# Patient Record
Sex: FEMALE | Race: WHITE | Employment: OTHER | URBAN - METROPOLITAN AREA
[De-identification: names, ages, dates, MRNs, and addresses within clinical notes are randomized per-mention and may not be internally consistent; named-entity substitution may affect disease eponyms.]

---

## 2018-04-09 ENCOUNTER — OFFICE VISIT (OUTPATIENT)
Dept: ORTHOPEDIC SURGERY | Age: 73
End: 2018-04-09

## 2018-04-09 VITALS — BODY MASS INDEX: 40.3 KG/M2 | WEIGHT: 219 LBS | HEIGHT: 62 IN

## 2018-04-09 DIAGNOSIS — M25.512 ACUTE PAIN OF LEFT SHOULDER: Primary | ICD-10-CM

## 2018-04-09 PROCEDURE — 99214 OFFICE O/P EST MOD 30 MIN: CPT | Performed by: ORTHOPAEDIC SURGERY

## 2018-04-09 PROCEDURE — 1090F PRES/ABSN URINE INCON ASSESS: CPT | Performed by: ORTHOPAEDIC SURGERY

## 2018-04-09 PROCEDURE — 3014F SCREEN MAMMO DOC REV: CPT | Performed by: ORTHOPAEDIC SURGERY

## 2018-04-09 PROCEDURE — G8417 CALC BMI ABV UP PARAM F/U: HCPCS | Performed by: ORTHOPAEDIC SURGERY

## 2018-04-09 PROCEDURE — G8427 DOCREV CUR MEDS BY ELIG CLIN: HCPCS | Performed by: ORTHOPAEDIC SURGERY

## 2018-04-09 PROCEDURE — G8400 PT W/DXA NO RESULTS DOC: HCPCS | Performed by: ORTHOPAEDIC SURGERY

## 2018-04-09 PROCEDURE — 3017F COLORECTAL CA SCREEN DOC REV: CPT | Performed by: ORTHOPAEDIC SURGERY

## 2018-04-09 PROCEDURE — 4040F PNEUMOC VAC/ADMIN/RCVD: CPT | Performed by: ORTHOPAEDIC SURGERY

## 2018-04-09 PROCEDURE — 1123F ACP DISCUSS/DSCN MKR DOCD: CPT | Performed by: ORTHOPAEDIC SURGERY

## 2018-04-09 PROCEDURE — 1036F TOBACCO NON-USER: CPT | Performed by: ORTHOPAEDIC SURGERY

## 2018-04-18 ENCOUNTER — OFFICE VISIT (OUTPATIENT)
Dept: ORTHOPEDIC SURGERY | Age: 73
End: 2018-04-18

## 2018-04-18 VITALS — HEIGHT: 62 IN | BODY MASS INDEX: 40.29 KG/M2 | WEIGHT: 218.92 LBS

## 2018-04-18 DIAGNOSIS — M75.112 INCOMPLETE TEAR OF LEFT ROTATOR CUFF: Primary | ICD-10-CM

## 2018-04-18 PROCEDURE — G8400 PT W/DXA NO RESULTS DOC: HCPCS | Performed by: ORTHOPAEDIC SURGERY

## 2018-04-18 PROCEDURE — 1123F ACP DISCUSS/DSCN MKR DOCD: CPT | Performed by: ORTHOPAEDIC SURGERY

## 2018-04-18 PROCEDURE — 1090F PRES/ABSN URINE INCON ASSESS: CPT | Performed by: ORTHOPAEDIC SURGERY

## 2018-04-18 PROCEDURE — 4040F PNEUMOC VAC/ADMIN/RCVD: CPT | Performed by: ORTHOPAEDIC SURGERY

## 2018-04-18 PROCEDURE — 99214 OFFICE O/P EST MOD 30 MIN: CPT | Performed by: ORTHOPAEDIC SURGERY

## 2018-04-18 PROCEDURE — 20610 DRAIN/INJ JOINT/BURSA W/O US: CPT | Performed by: ORTHOPAEDIC SURGERY

## 2018-04-18 PROCEDURE — 1036F TOBACCO NON-USER: CPT | Performed by: ORTHOPAEDIC SURGERY

## 2018-04-18 PROCEDURE — G8427 DOCREV CUR MEDS BY ELIG CLIN: HCPCS | Performed by: ORTHOPAEDIC SURGERY

## 2018-04-18 PROCEDURE — 3017F COLORECTAL CA SCREEN DOC REV: CPT | Performed by: ORTHOPAEDIC SURGERY

## 2018-04-18 PROCEDURE — G8417 CALC BMI ABV UP PARAM F/U: HCPCS | Performed by: ORTHOPAEDIC SURGERY

## 2018-04-18 PROCEDURE — 3014F SCREEN MAMMO DOC REV: CPT | Performed by: ORTHOPAEDIC SURGERY

## 2018-04-23 ENCOUNTER — HOSPITAL ENCOUNTER (OUTPATIENT)
Dept: PHYSICAL THERAPY | Age: 73
Discharge: HOME | End: 2018-04-30
Admitting: ORTHOPAEDIC SURGERY

## 2018-04-26 ENCOUNTER — HOSPITAL ENCOUNTER (OUTPATIENT)
Dept: PHYSICAL THERAPY | Age: 73
Discharge: HOME | End: 2018-04-27
Admitting: ORTHOPAEDIC SURGERY

## 2018-05-01 ENCOUNTER — HOSPITAL ENCOUNTER (OUTPATIENT)
Dept: PHYSICAL THERAPY | Age: 73
Discharge: HOME | End: 2018-05-02
Admitting: ORTHOPAEDIC SURGERY

## 2018-05-01 ENCOUNTER — HOSPITAL ENCOUNTER (OUTPATIENT)
Dept: OTHER | Age: 73
Discharge: HOME | End: 2018-05-31
Attending: ORTHOPAEDIC SURGERY | Admitting: ORTHOPAEDIC SURGERY

## 2018-05-04 ENCOUNTER — HOSPITAL ENCOUNTER (OUTPATIENT)
Dept: PHYSICAL THERAPY | Age: 73
Discharge: HOME | End: 2018-05-05
Admitting: ORTHOPAEDIC SURGERY

## 2018-05-10 ENCOUNTER — HOSPITAL ENCOUNTER (OUTPATIENT)
Dept: PHYSICAL THERAPY | Age: 73
Discharge: HOME | End: 2018-05-11
Admitting: ORTHOPAEDIC SURGERY

## 2018-05-14 ENCOUNTER — HOSPITAL ENCOUNTER (OUTPATIENT)
Dept: PHYSICAL THERAPY | Age: 73
Discharge: HOME | End: 2018-05-15
Admitting: ORTHOPAEDIC SURGERY

## 2018-05-16 ENCOUNTER — OFFICE VISIT (OUTPATIENT)
Dept: ORTHOPEDIC SURGERY | Age: 73
End: 2018-05-16

## 2018-05-16 VITALS — BODY MASS INDEX: 40.29 KG/M2 | HEIGHT: 62 IN | WEIGHT: 218.92 LBS

## 2018-05-16 DIAGNOSIS — M75.112 INCOMPLETE TEAR OF LEFT ROTATOR CUFF: Primary | ICD-10-CM

## 2018-05-16 DIAGNOSIS — M25.512 ACUTE PAIN OF LEFT SHOULDER: ICD-10-CM

## 2018-05-16 PROCEDURE — 1123F ACP DISCUSS/DSCN MKR DOCD: CPT | Performed by: ORTHOPAEDIC SURGERY

## 2018-05-16 PROCEDURE — 4040F PNEUMOC VAC/ADMIN/RCVD: CPT | Performed by: ORTHOPAEDIC SURGERY

## 2018-05-16 PROCEDURE — G8427 DOCREV CUR MEDS BY ELIG CLIN: HCPCS | Performed by: ORTHOPAEDIC SURGERY

## 2018-05-16 PROCEDURE — 99214 OFFICE O/P EST MOD 30 MIN: CPT | Performed by: ORTHOPAEDIC SURGERY

## 2018-05-16 PROCEDURE — 3017F COLORECTAL CA SCREEN DOC REV: CPT | Performed by: ORTHOPAEDIC SURGERY

## 2018-05-16 PROCEDURE — 1036F TOBACCO NON-USER: CPT | Performed by: ORTHOPAEDIC SURGERY

## 2018-05-16 PROCEDURE — G8400 PT W/DXA NO RESULTS DOC: HCPCS | Performed by: ORTHOPAEDIC SURGERY

## 2018-05-16 PROCEDURE — G8417 CALC BMI ABV UP PARAM F/U: HCPCS | Performed by: ORTHOPAEDIC SURGERY

## 2018-05-16 PROCEDURE — 1090F PRES/ABSN URINE INCON ASSESS: CPT | Performed by: ORTHOPAEDIC SURGERY

## 2018-05-30 ENCOUNTER — OFFICE VISIT (OUTPATIENT)
Dept: ORTHOPEDIC SURGERY | Age: 73
End: 2018-05-30

## 2018-05-30 VITALS — HEIGHT: 62 IN | BODY MASS INDEX: 40.33 KG/M2 | WEIGHT: 219.14 LBS

## 2018-05-30 DIAGNOSIS — M79.671 RIGHT FOOT PAIN: Primary | ICD-10-CM

## 2018-05-30 PROCEDURE — 1036F TOBACCO NON-USER: CPT | Performed by: ORTHOPAEDIC SURGERY

## 2018-05-30 PROCEDURE — 4040F PNEUMOC VAC/ADMIN/RCVD: CPT | Performed by: ORTHOPAEDIC SURGERY

## 2018-05-30 PROCEDURE — 3017F COLORECTAL CA SCREEN DOC REV: CPT | Performed by: ORTHOPAEDIC SURGERY

## 2018-05-30 PROCEDURE — G8400 PT W/DXA NO RESULTS DOC: HCPCS | Performed by: ORTHOPAEDIC SURGERY

## 2018-05-30 PROCEDURE — G8417 CALC BMI ABV UP PARAM F/U: HCPCS | Performed by: ORTHOPAEDIC SURGERY

## 2018-05-30 PROCEDURE — 1090F PRES/ABSN URINE INCON ASSESS: CPT | Performed by: ORTHOPAEDIC SURGERY

## 2018-05-30 PROCEDURE — G8427 DOCREV CUR MEDS BY ELIG CLIN: HCPCS | Performed by: ORTHOPAEDIC SURGERY

## 2018-05-30 PROCEDURE — 99214 OFFICE O/P EST MOD 30 MIN: CPT | Performed by: ORTHOPAEDIC SURGERY

## 2018-05-30 PROCEDURE — 1123F ACP DISCUSS/DSCN MKR DOCD: CPT | Performed by: ORTHOPAEDIC SURGERY

## 2018-06-01 ENCOUNTER — HOSPITAL ENCOUNTER (OUTPATIENT)
Dept: OTHER | Age: 73
Discharge: HOME | End: 2018-06-30
Attending: ORTHOPAEDIC SURGERY | Admitting: ORTHOPAEDIC SURGERY

## 2018-06-05 ENCOUNTER — HOSPITAL ENCOUNTER (OUTPATIENT)
Dept: PHYSICAL THERAPY | Age: 73
Discharge: HOME | End: 2018-06-06
Admitting: ORTHOPAEDIC SURGERY

## 2018-06-08 ENCOUNTER — HOSPITAL ENCOUNTER (OUTPATIENT)
Dept: PHYSICAL THERAPY | Age: 73
Discharge: HOME | End: 2018-06-09
Admitting: ORTHOPAEDIC SURGERY

## 2018-06-12 ENCOUNTER — HOSPITAL ENCOUNTER (OUTPATIENT)
Dept: PHYSICAL THERAPY | Age: 73
Discharge: HOME | End: 2018-06-13
Admitting: ORTHOPAEDIC SURGERY

## 2018-06-15 ENCOUNTER — HOSPITAL ENCOUNTER (OUTPATIENT)
Dept: PHYSICAL THERAPY | Age: 73
Discharge: HOME | End: 2018-06-16
Admitting: ORTHOPAEDIC SURGERY

## 2018-06-19 ENCOUNTER — HOSPITAL ENCOUNTER (OUTPATIENT)
Dept: PHYSICAL THERAPY | Age: 73
Discharge: HOME | End: 2018-06-20
Admitting: ORTHOPAEDIC SURGERY

## 2018-06-22 ENCOUNTER — HOSPITAL ENCOUNTER (OUTPATIENT)
Dept: PHYSICAL THERAPY | Age: 73
Discharge: HOME | End: 2018-06-23
Admitting: ORTHOPAEDIC SURGERY

## 2018-06-26 ENCOUNTER — HOSPITAL ENCOUNTER (OUTPATIENT)
Dept: PHYSICAL THERAPY | Age: 73
Discharge: HOME | End: 2018-06-27
Admitting: ORTHOPAEDIC SURGERY

## 2018-06-29 ENCOUNTER — HOSPITAL ENCOUNTER (OUTPATIENT)
Dept: PHYSICAL THERAPY | Age: 73
Discharge: HOME | End: 2018-07-31
Admitting: ORTHOPAEDIC SURGERY

## 2018-06-29 NOTE — FLOWSHEET NOTE
NMR re-education      Post Tib activation       CK doming       Tandem balance 5 min B 10\" stance alternating. BOSU reach/Prop      Biodex Bal      BAPS      Wobble board 1 30ea  IN/EV, PF/DF   corner rotation   Airex        Therapeutic Exercise and NMR EXR  [x] (11874) Provided verbal/tactile cueing for activities related to strengthening, flexibility, endurance, ROM for improvements in foot/ankle,LE, proximal hip, and core control with self care, mobility, ambulation. [x] (62292) Provided verbal/tactile cueing for activities related to improving balance, coordination, kinesthetic sense, posture, motor skill, proprioception  to assist with foot/ankle, LE, proximal hip, and core control in self care, mobility, ambulation and eccentric single leg control.      NMR and Therapeutic Activities:    [] (12841 or 81921) Provided verbal/tactile cueing for activities related to improving balance, coordination, kinesthetic sense, posture, motor skill, proprioception and motor activation to allow for proper function of core, proximal hip and LE with self care and ADLs  [] (67045) Gait Re-education- Provided training and instruction to the patient for proper LE, core and proximal hip recruitment and positioning and eccentric body weight control with ambulation re-education including up and down stairs     Home Exercise Program:    [x] (27493) Reviewed/Progressed HEP activities related to strengthening, flexibility, endurance, ROM of core, proximal hip and LE for functional self-care, mobility, and ambulation/stair navigation   [] (39779)Reviewed/Progressed HEP activities related to improving balance, coordination, kinesthetic sense, posture, motor skill, proprioception of core, proximal hip and LE for self care, mobility, and ambulation/stair navigation      Manual Treatments:  PROM / STM / Oscillations-Mobs:  G-I, II, III, IV (PA's, Inf., Post.)  [x] (24201) Provided manual therapy to mobilize LE, proximal hip and/or LS spine soft tissue/joints for the purpose of modulating pain, promoting relaxation,  increasing ROM, reducing/eliminating soft tissue swelling/inflammation/restriction, improving soft tissue extensibility and allowing for proper ROM for normal function with self care, mobility, and ambulation. Modalities:  Game ready at end of session for 15 min    Charges:  Timed Code Treatment Minutes: 34   Total Treatment Minutes: 48     [] EVAL (LOW) 33930 (typically 20 minutes face-to-face)  [] EVAL (MOD) 25777 (typically 30 minutes face-to-face)  [] EVAL (HIGH) 52436 (typically 45 minutes face-to-face)  [] RE-EVAL     [x] XB(47524) x  1   [] IONTO  [] NMR (38372) x      [] VASO  [x] Manual (15933) x  1    [] Other:  [] TA x       [] Mech Traction (47347)  [] ES(attended) (59187)      [] ES (un) (70241):     GOALS:   Patient stated goal: To decrease foot pain with ambulation and stairs     Therapist goals for Patient:   Short Term Goals: To be achieved in: 2 weeks  1. Independent in HEP and progression per patient tolerance, in order to prevent re-injury. .      Long Term Goals: To be achieved in: 6 weeks  1. Disability index score of 25% or less for the LEFS to assist with reaching prior level of function. 2. Patient will demonstrate increased AROM to WNL to allow for proper joint functioning as indicated by patients Functional Deficits. 3. Patient will demonstrate an increase in Strength to good proximal hip strength and control, within 5lb HHD in LE to allow for proper functional mobility as indicated by patients Functional Deficits. 4. Patient will return to running activities without increased symptoms or restriction. 5. Patient will demonstrate improved gait mechanics in order to prevent further LE injury. 6. Patient will report minimal pain with stairs and walking greater than 30 minutes      Progression Towards Functional goals:  [x] Patient is progressing as expected towards functional goals listed.

## 2018-07-01 ENCOUNTER — HOSPITAL ENCOUNTER (OUTPATIENT)
Dept: OTHER | Age: 73
Discharge: HOME | End: 2018-07-01
Attending: ORTHOPAEDIC SURGERY | Admitting: ORTHOPAEDIC SURGERY

## 2018-07-02 ENCOUNTER — HOSPITAL ENCOUNTER (OUTPATIENT)
Dept: PHYSICAL THERAPY | Age: 73
Discharge: HOME | End: 2018-07-03
Admitting: ORTHOPAEDIC SURGERY

## 2018-07-02 NOTE — FLOWSHEET NOTE
4 min     TCJ 3  min     STJ      Long axis manip   G IV/   Cuboid/Cuneiform manip      STM/IASTM 12 min           NMR re-education      Post Tib activation       CKC doming       Tandem balance 5 min B 10\" stance alternating. BOSU reach/Prop      Biodex Bal      BAPS      Wobble board 1 30ea  IN/EV, PF/DF   corner rotation   Airex        Therapeutic Exercise and NMR EXR  [x] (45285) Provided verbal/tactile cueing for activities related to strengthening, flexibility, endurance, ROM for improvements in foot/ankle,LE, proximal hip, and core control with self care, mobility, ambulation. [x] (11808) Provided verbal/tactile cueing for activities related to improving balance, coordination, kinesthetic sense, posture, motor skill, proprioception  to assist with foot/ankle, LE, proximal hip, and core control in self care, mobility, ambulation and eccentric single leg control.      NMR and Therapeutic Activities:    [] (28712 or 60804) Provided verbal/tactile cueing for activities related to improving balance, coordination, kinesthetic sense, posture, motor skill, proprioception and motor activation to allow for proper function of core, proximal hip and LE with self care and ADLs  [] (44118) Gait Re-education- Provided training and instruction to the patient for proper LE, core and proximal hip recruitment and positioning and eccentric body weight control with ambulation re-education including up and down stairs     Home Exercise Program:    [x] (87336) Reviewed/Progressed HEP activities related to strengthening, flexibility, endurance, ROM of core, proximal hip and LE for functional self-care, mobility, and ambulation/stair navigation   [] (13946)Reviewed/Progressed HEP activities related to improving balance, coordination, kinesthetic sense, posture, motor skill, proprioception of core, proximal hip and LE for self care, mobility, and ambulation/stair navigation      Manual Treatments:  PROM / STM / Oscillations-Mobs: G-I, II, III, IV (PA's, Inf., Post.)  [x] (53287) Provided manual therapy to mobilize LE, proximal hip and/or LS spine soft tissue/joints for the purpose of modulating pain, promoting relaxation,  increasing ROM, reducing/eliminating soft tissue swelling/inflammation/restriction, improving soft tissue extensibility and allowing for proper ROM for normal function with self care, mobility, and ambulation. Modalities:  Game ready at end of session for 15 min    Charges:  Timed Code Treatment Minutes: 34   Total Treatment Minutes: 40     [] EVAL (LOW) 10479 (typically 20 minutes face-to-face)  [] EVAL (MOD) 36141 (typically 30 minutes face-to-face)  [] EVAL (HIGH) 80143 (typically 45 minutes face-to-face)  [] RE-EVAL     [] SN(46879) x      [] IONTO  [x] NMR (65360) x  1   [] VASO  [x] Manual (83246) x  1    [] Other:  [] TA x       [] Mech Traction (66019)  [] ES(attended) (46724)      [] ES (un) (67675):     GOALS:   Patient stated goal: To decrease foot pain with ambulation and stairs     Therapist goals for Patient:   Short Term Goals: To be achieved in: 2 weeks  1. Independent in HEP and progression per patient tolerance, in order to prevent re-injury. .      Long Term Goals: To be achieved in: 6 weeks  1. Disability index score of 25% or less for the LEFS to assist with reaching prior level of function. 2. Patient will demonstrate increased AROM to WNL to allow for proper joint functioning as indicated by patients Functional Deficits. 3. Patient will demonstrate an increase in Strength to good proximal hip strength and control, within 5lb HHD in LE to allow for proper functional mobility as indicated by patients Functional Deficits. 4. Patient will return to running activities without increased symptoms or restriction. 5. Patient will demonstrate improved gait mechanics in order to prevent further LE injury.    6. Patient will report minimal pain with stairs and walking greater than 30 minutes      Progression Towards Functional goals:  [x] Patient is progressing as expected towards functional goals listed. [] Progression is slowed due to complexities listed. [] Progression has been slowed due to co-morbidities. [] Plan just implemented, too soon to assess goals progression  [] Other:     ASSESSMENT:  Patient continues to ambulate with limited great toe extension during push off stage of the gait cycle. Hypomobility throughout the first ray also noted. Good response to manual therapy today. Treatment/Activity Tolerance:  [] Patient tolerated treatment well [] Patient limited by fatique  [x] Patient limited by pain  [] Patient limited by other medical complications  [] Other:     Prognosis: [x] Good [] Fair  [] Poor    Patient Requires Follow-up: [x] Yes  [] No    PLAN: PN NEXT SESSION.   [x] Continue per plan of care [] Alter current plan (see comments)  [] Plan of care initiated [] Hold pending MD visit [] Discharge    Electronically signed by: Aleksey Mcneal PT

## 2018-07-06 ENCOUNTER — HOSPITAL ENCOUNTER (OUTPATIENT)
Dept: PHYSICAL THERAPY | Age: 73
Discharge: HOME | End: 2018-07-07
Admitting: ORTHOPAEDIC SURGERY

## 2018-07-06 NOTE — PLAN OF CARE
none    Exercises/Interventions:   Therapeutic Ex Sets/sec Reps Notes   Toe lifts/ heel raise 1 20    Seated Iv/Ev Glute Med      Hip ext      4 way ankle      TM Gait/ push off      Step-ups fwd/lat 1 20ea Air-ex pad         Gastroc Flexibility 3 20-30\"    Soleus Flexibility 3 20-30\"                Manual Interventon      Ankle Manip/Mobs 4 min     TCJ 3  min     STJ      Long axis manip   G IV/   Cuboid/Cuneiform manip      STM/IASTM 12 min           NMR re-education      Post Tib activation       CKC doming       Tandem balance 5 min B 10\" stance alternating. BOSU reach/Prop      Biodex Bal      BAPS      Wobble board 1 30ea  IN/EV, PF/DF   corner rotation   Airex        Therapeutic Exercise and NMR EXR  [x] (56794) Provided verbal/tactile cueing for activities related to strengthening, flexibility, endurance, ROM for improvements in foot/ankle,LE, proximal hip, and core control with self care, mobility, ambulation. [x] (22936) Provided verbal/tactile cueing for activities related to improving balance, coordination, kinesthetic sense, posture, motor skill, proprioception  to assist with foot/ankle, LE, proximal hip, and core control in self care, mobility, ambulation and eccentric single leg control.      NMR and Therapeutic Activities:    [] (29020 or 64178) Provided verbal/tactile cueing for activities related to improving balance, coordination, kinesthetic sense, posture, motor skill, proprioception and motor activation to allow for proper function of core, proximal hip and LE with self care and ADLs  [] (46110) Gait Re-education- Provided training and instruction to the patient for proper LE, core and proximal hip recruitment and positioning and eccentric body weight control with ambulation re-education including up and down stairs     Home Exercise Program:    [x] (92161) Reviewed/Progressed HEP activities related to strengthening, flexibility, endurance, ROM of core, proximal hip and LE for functional

## 2018-07-18 ENCOUNTER — OFFICE VISIT (OUTPATIENT)
Dept: ORTHOPEDIC SURGERY | Age: 73
End: 2018-07-18

## 2018-07-18 VITALS — WEIGHT: 219 LBS | HEIGHT: 62 IN | BODY MASS INDEX: 40.3 KG/M2

## 2018-07-18 DIAGNOSIS — M79.671 RIGHT FOOT PAIN: Primary | ICD-10-CM

## 2018-07-18 PROCEDURE — 1036F TOBACCO NON-USER: CPT | Performed by: ORTHOPAEDIC SURGERY

## 2018-07-18 PROCEDURE — 1101F PT FALLS ASSESS-DOCD LE1/YR: CPT | Performed by: ORTHOPAEDIC SURGERY

## 2018-07-18 PROCEDURE — 4040F PNEUMOC VAC/ADMIN/RCVD: CPT | Performed by: ORTHOPAEDIC SURGERY

## 2018-07-18 PROCEDURE — G8417 CALC BMI ABV UP PARAM F/U: HCPCS | Performed by: ORTHOPAEDIC SURGERY

## 2018-07-18 PROCEDURE — G8427 DOCREV CUR MEDS BY ELIG CLIN: HCPCS | Performed by: ORTHOPAEDIC SURGERY

## 2018-07-18 PROCEDURE — G8400 PT W/DXA NO RESULTS DOC: HCPCS | Performed by: ORTHOPAEDIC SURGERY

## 2018-07-18 PROCEDURE — 3017F COLORECTAL CA SCREEN DOC REV: CPT | Performed by: ORTHOPAEDIC SURGERY

## 2018-07-18 PROCEDURE — 1123F ACP DISCUSS/DSCN MKR DOCD: CPT | Performed by: ORTHOPAEDIC SURGERY

## 2018-07-18 PROCEDURE — 1090F PRES/ABSN URINE INCON ASSESS: CPT | Performed by: ORTHOPAEDIC SURGERY

## 2018-07-18 PROCEDURE — 99213 OFFICE O/P EST LOW 20 MIN: CPT | Performed by: ORTHOPAEDIC SURGERY

## 2018-08-01 ENCOUNTER — HOSPITAL ENCOUNTER (OUTPATIENT)
Dept: OTHER | Age: 73
Discharge: HOME | End: 2018-08-31
Attending: ORTHOPAEDIC SURGERY | Admitting: ORTHOPAEDIC SURGERY

## 2019-03-07 PROBLEM — E83.52 HYPERCALCEMIA: Status: ACTIVE | Noted: 2019-03-07

## 2019-03-07 PROBLEM — N17.9 AKI (ACUTE KIDNEY INJURY) (HCC): Status: ACTIVE | Noted: 2019-03-07

## 2019-03-07 PROBLEM — I10 ESSENTIAL HYPERTENSION: Status: ACTIVE | Noted: 2019-03-07

## 2019-08-12 ENCOUNTER — APPOINTMENT (RX ONLY)
Dept: URBAN - METROPOLITAN AREA CLINIC 170 | Facility: CLINIC | Age: 74
Setting detail: DERMATOLOGY
End: 2019-08-12

## 2019-08-12 DIAGNOSIS — B07.8 OTHER VIRAL WARTS: ICD-10-CM

## 2019-08-12 DIAGNOSIS — D22 MELANOCYTIC NEVI: ICD-10-CM

## 2019-08-12 DIAGNOSIS — L85.3 XEROSIS CUTIS: ICD-10-CM

## 2019-08-12 PROBLEM — D22.71 MELANOCYTIC NEVI OF RIGHT LOWER LIMB, INCLUDING HIP: Status: ACTIVE | Noted: 2019-08-12

## 2019-08-12 PROCEDURE — ? ADDITIONAL NOTES

## 2019-08-12 PROCEDURE — 99213 OFFICE O/P EST LOW 20 MIN: CPT | Mod: 25

## 2019-08-12 PROCEDURE — 17110 DESTRUCTION B9 LES UP TO 14: CPT

## 2019-08-12 PROCEDURE — ? PRESCRIPTION SAMPLES PROVIDED

## 2019-08-12 PROCEDURE — ? COUNSELING

## 2019-08-12 PROCEDURE — ? LIQUID NITROGEN

## 2019-08-12 ASSESSMENT — LOCATION DETAILED DESCRIPTION DERM
LOCATION DETAILED: RIGHT PROXIMAL PRETIBIAL REGION
LOCATION DETAILED: LEFT ANTERIOR PROXIMAL THIGH
LOCATION DETAILED: RIGHT PROXIMAL DORSAL FOREARM
LOCATION DETAILED: LEFT PROXIMAL PRETIBIAL REGION
LOCATION DETAILED: LEFT PROXIMAL DORSAL FOREARM
LOCATION DETAILED: RIGHT SUPERIOR UPPER BACK
LOCATION DETAILED: RIGHT ANKLE

## 2019-08-12 ASSESSMENT — LOCATION SIMPLE DESCRIPTION DERM
LOCATION SIMPLE: LEFT PRETIBIAL REGION
LOCATION SIMPLE: RIGHT ANKLE
LOCATION SIMPLE: RIGHT FOREARM
LOCATION SIMPLE: LEFT FOREARM
LOCATION SIMPLE: RIGHT PRETIBIAL REGION
LOCATION SIMPLE: RIGHT UPPER BACK
LOCATION SIMPLE: LEFT THIGH

## 2019-08-12 ASSESSMENT — LOCATION ZONE DERM
LOCATION ZONE: LEG
LOCATION ZONE: TRUNK
LOCATION ZONE: ARM

## 2019-08-12 NOTE — HPI: SKIN LESIONS
How Severe Is Your Skin Lesion?: mild
Have Your Skin Lesions Been Treated?: not been treated
Is This A New Presentation, Or A Follow-Up?: Skin Lesions
Which Family Member (Optional)?: Dad
Additional History: Patient stAtes the area is sore

## 2019-11-11 ENCOUNTER — APPOINTMENT (RX ONLY)
Dept: URBAN - METROPOLITAN AREA CLINIC 170 | Facility: CLINIC | Age: 74
Setting detail: DERMATOLOGY
End: 2019-11-11

## 2019-11-11 DIAGNOSIS — D22 MELANOCYTIC NEVI: ICD-10-CM

## 2019-11-11 DIAGNOSIS — D18.0 HEMANGIOMA: ICD-10-CM

## 2019-11-11 DIAGNOSIS — L81.4 OTHER MELANIN HYPERPIGMENTATION: ICD-10-CM

## 2019-11-11 DIAGNOSIS — L82.0 INFLAMED SEBORRHEIC KERATOSIS: ICD-10-CM

## 2019-11-11 DIAGNOSIS — L82.1 OTHER SEBORRHEIC KERATOSIS: ICD-10-CM

## 2019-11-11 PROBLEM — D18.01 HEMANGIOMA OF SKIN AND SUBCUTANEOUS TISSUE: Status: ACTIVE | Noted: 2019-11-11

## 2019-11-11 PROBLEM — D22.5 MELANOCYTIC NEVI OF TRUNK: Status: ACTIVE | Noted: 2019-11-11

## 2019-11-11 PROCEDURE — ? COUNSELING

## 2019-11-11 PROCEDURE — 17110 DESTRUCTION B9 LES UP TO 14: CPT

## 2019-11-11 PROCEDURE — ? LIQUID NITROGEN

## 2019-11-11 PROCEDURE — ? FULL BODY SKIN EXAM

## 2019-11-11 PROCEDURE — 99214 OFFICE O/P EST MOD 30 MIN: CPT | Mod: 25

## 2019-11-11 PROCEDURE — ? SUNSCREEN RECOMMENDATIONS

## 2019-11-11 ASSESSMENT — LOCATION DETAILED DESCRIPTION DERM
LOCATION DETAILED: EPIGASTRIC SKIN
LOCATION DETAILED: RIGHT DISTAL POSTERIOR UPPER ARM
LOCATION DETAILED: RIGHT ANTERIOR SHOULDER
LOCATION DETAILED: LEFT LATERAL ABDOMEN
LOCATION DETAILED: INFERIOR THORACIC SPINE
LOCATION DETAILED: RIGHT INFERIOR MEDIAL MIDBACK

## 2019-11-11 ASSESSMENT — LOCATION SIMPLE DESCRIPTION DERM
LOCATION SIMPLE: RIGHT LOWER BACK
LOCATION SIMPLE: RIGHT SHOULDER
LOCATION SIMPLE: RIGHT POSTERIOR UPPER ARM
LOCATION SIMPLE: ABDOMEN
LOCATION SIMPLE: UPPER BACK

## 2019-11-11 ASSESSMENT — LOCATION ZONE DERM
LOCATION ZONE: TRUNK
LOCATION ZONE: ARM

## 2019-11-11 NOTE — PROCEDURE: LIQUID NITROGEN
Number Of Freeze-Thaw Cycles: 2 freeze-thaw cycles
Render Note In Bullet Format When Appropriate: No
Medical Necessity Clause: This procedure was medically necessary because the lesions that were treated were:
Medical Necessity Information: It is in your best interest to select a reason for this procedure from the list below. All of these items fulfill various CMS LCD requirements except the new and changing color options.
Detail Level: Detailed
Consent: The patient's consent was obtained including but not limited to risks of crusting, scabbing, blistering, scarring, darker or lighter pigmentary change, recurrence, incomplete removal and infection.
Post-Care Instructions: I reviewed with the patient in detail post-care instructions. Patient is to wear sunprotection, and avoid picking at any of the treated lesions. Pt may apply Vaseline to crusted or scabbing areas.
Render Post-Care Instructions In Note?: yes

## 2020-03-25 ENCOUNTER — TELEPHONE (OUTPATIENT)
Dept: ORTHOPEDIC SURGERY | Age: 75
End: 2020-03-25

## 2020-03-25 ENCOUNTER — OFFICE VISIT (OUTPATIENT)
Dept: ORTHOPEDIC SURGERY | Age: 75
End: 2020-03-25
Payer: MEDICARE

## 2020-03-25 VITALS — WEIGHT: 222 LBS | BODY MASS INDEX: 40.85 KG/M2 | HEIGHT: 62 IN

## 2020-03-25 PROCEDURE — 99214 OFFICE O/P EST MOD 30 MIN: CPT | Performed by: ORTHOPAEDIC SURGERY

## 2020-03-25 PROCEDURE — L3908 WHO COCK-UP NONMOLDE PRE OTS: HCPCS | Performed by: ORTHOPAEDIC SURGERY

## 2020-03-25 RX ORDER — PREDNISONE 10 MG/1
TABLET ORAL
Qty: 30 TABLET | Refills: 0 | Status: SHIPPED | OUTPATIENT
Start: 2020-03-25 | End: 2020-10-13 | Stop reason: ALTCHOICE

## 2020-03-25 NOTE — PROGRESS NOTES
week: None     Minutes per session: None    Stress: None   Relationships    Social connections     Talks on phone: None     Gets together: None     Attends Restorationism service: None     Active member of club or organization: None     Attends meetings of clubs or organizations: None     Relationship status: None    Intimate partner violence     Fear of current or ex partner: None     Emotionally abused: None     Physically abused: None     Forced sexual activity: None   Other Topics Concern    None   Social History Narrative    None     Current Outpatient Medications   Medication Sig Dispense Refill    aspirin (ASPIR-81) 81 MG EC tablet Aspir-81   take one at night      GEMFIBROZIL PO Take by mouth      loratadine (CLARITIN) 10 MG tablet Claritin 10 mg tablet   Take 1 tablet every day by oral route in the morning.  guaiFENesin (MUCINEX MAXIMUM STRENGTH) 1200 MG TB12 Mucinex 1,200 mg tablet, extended release   Take 1 tablet twice a day by oral route.  pravastatin (PRAVACHOL) 20 MG tablet pravastatin 20 mg tablet   Take 1 tablet every day by oral route.  Probiotic Product (PROBIOTIC-10 PO) Probiotic   1 x day      NUTRITIONAL SUPPLEMENTS PO Take by mouth      MULTIPLE VITAMINS-MINERALS ER PO Take by mouth      Magnesium 100 MG CAPS magnesium   500mg one daily      Multiple Minerals-Vitamins (CITRACAL PLUS BONE DENSITY PO) Citracal + Bone Density   2 scoops daily      Coenzyme Q10 10 MG CAPS Take 1 capsule by mouth      Cranberry-Vitamin C-Probiotic (AZO CRANBERRY) 250-30 MG TABS Azo Cranberry + Probiotic      B COMPLEX VITAMINS ER PO Take 2 tablets by mouth      Omega-3 Fatty Acids (FISH OIL PO) Take by mouth      Omega-3 Fatty Acids (FISH OIL) 1000 MG CAPS Fish Oil   500mg  3 x day      atenolol (TENORMIN) 50 MG tablet Take 50 mg by mouth daily.  cyclobenzaprine (FLEXERIL) 10 MG tablet Take 10 mg by mouth.  insulin lispro (HUMALOG) 100 UNIT/ML pen Inject  into the skin.       insulin NPH (HUMULIN N) 100 UNIT/ML injection vial Inject  into the skin.  levothyroxine (SYNTHROID) 100 MCG tablet Take 100 mcg by mouth.  lisinopril (PRINIVIL;ZESTRIL) 20 MG tablet Take 20 mg by mouth.  metFORMIN (GLUCOPHAGE) 500 MG tablet Take 500 mg by mouth.  nateglinide (STARLIX) 120 MG tablet Take 120 mg by mouth.  omeprazole (PRILOSEC) 40 MG capsule Take 40 mg by mouth.  triamterene-hydrochlorothiazide (DYAZIDE) 37.5-25 MG per capsule Take 1 capsule by mouth.  zolpidem (AMBIEN) 10 MG tablet Take 15 mg by mouth. No current facility-administered medications for this visit. Allergies   Allergen Reactions    Atorvastatin Other (See Comments)    Adhesive Tape Rash    Codeine Nausea And Vomiting and Nausea Only    Sulfa Antibiotics Other (See Comments) and Rash       REVIEW OF SYSTEMS:   No rashes today  No new numbness  No tingling  No fevers  No depression  No new onset of pain        Pertinent items are noted in HPI  Review of systems reviewed from Patient History Form dated on 3/25/2020 and available in the patient's chart under the Media tab. Examination:    General Exam:    Vitals: Height 5' 2\" (1.575 m), weight 222 lb (100.7 kg), not currently breastfeeding. Constitutional: Patient is adequately groomed with no evidence of malnutrition  Mental Status: The patient is oriented to time, place and person. The patient's mood and affect are appropriate. Lymphatic: The lymphatic examination bilaterally reveals all areas to be without enlargement or induration. Vascular: Examination reveals no swelling or calf tenderness. Peripheral pulses are palpable and 2+. Neurological: The patient has good coordination. There is no weakness or sensory deficit. Skin:    Head/Neck: inspection reveals no rashes, ulcerations or lesions. Trunk:  inspection reveals no rashes, ulcerations or lesions.   Right Lower Extremity:

## 2020-06-23 ENCOUNTER — APPOINTMENT (RX ONLY)
Dept: URBAN - METROPOLITAN AREA CLINIC 170 | Facility: CLINIC | Age: 75
Setting detail: DERMATOLOGY
End: 2020-06-23

## 2020-06-23 DIAGNOSIS — L82.1 OTHER SEBORRHEIC KERATOSIS: ICD-10-CM

## 2020-06-23 DIAGNOSIS — L57.0 ACTINIC KERATOSIS: ICD-10-CM | Status: RESOLVED

## 2020-06-23 PROCEDURE — ? COUNSELING

## 2020-06-23 PROCEDURE — 99213 OFFICE O/P EST LOW 20 MIN: CPT

## 2020-06-23 PROCEDURE — ? PRESCRIPTION MEDICATION MANAGEMENT

## 2020-06-23 PROCEDURE — ? ADDITIONAL NOTES

## 2020-06-23 ASSESSMENT — LOCATION ZONE DERM
LOCATION ZONE: FACE
LOCATION ZONE: HAND

## 2020-06-23 ASSESSMENT — LOCATION SIMPLE DESCRIPTION DERM
LOCATION SIMPLE: GLABELLA
LOCATION SIMPLE: LEFT HAND

## 2020-06-23 ASSESSMENT — LOCATION DETAILED DESCRIPTION DERM
LOCATION DETAILED: GLABELLA
LOCATION DETAILED: LEFT RADIAL DORSAL HAND

## 2020-06-23 NOTE — HPI: SKIN LESION
What Type Of Note Output Would You Prefer (Optional)?: Bullet Format
How Severe Is Your Skin Lesion?: mild
Has Your Skin Lesion Been Treated?: been treated
Is This A New Presentation, Or A Follow-Up?: Follow Up Skin Lesion
Additional History: Patient states the lesion is gone

## 2020-07-22 ENCOUNTER — OFFICE VISIT (OUTPATIENT)
Dept: ORTHOPEDIC SURGERY | Age: 75
End: 2020-07-22
Payer: MEDICARE

## 2020-07-22 VITALS — WEIGHT: 222 LBS | TEMPERATURE: 97.8 F | BODY MASS INDEX: 40.85 KG/M2 | HEIGHT: 62 IN

## 2020-07-22 PROCEDURE — 99214 OFFICE O/P EST MOD 30 MIN: CPT | Performed by: ORTHOPAEDIC SURGERY

## 2020-07-22 PROCEDURE — 1090F PRES/ABSN URINE INCON ASSESS: CPT | Performed by: ORTHOPAEDIC SURGERY

## 2020-07-22 PROCEDURE — 3017F COLORECTAL CA SCREEN DOC REV: CPT | Performed by: ORTHOPAEDIC SURGERY

## 2020-07-22 PROCEDURE — G8417 CALC BMI ABV UP PARAM F/U: HCPCS | Performed by: ORTHOPAEDIC SURGERY

## 2020-07-22 PROCEDURE — 1123F ACP DISCUSS/DSCN MKR DOCD: CPT | Performed by: ORTHOPAEDIC SURGERY

## 2020-07-22 PROCEDURE — G8400 PT W/DXA NO RESULTS DOC: HCPCS | Performed by: ORTHOPAEDIC SURGERY

## 2020-07-22 PROCEDURE — 1036F TOBACCO NON-USER: CPT | Performed by: ORTHOPAEDIC SURGERY

## 2020-07-22 PROCEDURE — G8427 DOCREV CUR MEDS BY ELIG CLIN: HCPCS | Performed by: ORTHOPAEDIC SURGERY

## 2020-07-22 PROCEDURE — 4040F PNEUMOC VAC/ADMIN/RCVD: CPT | Performed by: ORTHOPAEDIC SURGERY

## 2020-07-22 NOTE — PROGRESS NOTES
7/22/20  History of Present Illness:  Jerome Hannon is a 76 y.o. female    Chief complaint today in the office: Being seen for the first time for new problem left leg pain is been going on for approximately 3 weeks pain is posterior from the glutes low back glutes thigh knee back of the calf and foot    Location left knee   Severity moderate  Duration 3+ weeks  Associated sign/symptoms pain all the way down the left lower extremity    Medical History  Patient's medications, allergies, past medical, surgical, social and family histories were reviewed and updated as appropriate. I have reviewed and discussed the below Pain assessment findings with the patient. Pain Assessment  Location of Pain: Knee(Simultaneous filing. User may not have seen previous data.)  Location Modifiers: Left(Simultaneous filing. User may not have seen previous data.)  Severity of Pain: 0(Simultaneous filing. User may not have seen previous data.)  Quality of Pain: Throbbing, Sharp, Dull, Aching  Duration of Pain: Persistent  Frequency of Pain: Constant  Aggravating Factors: Bending, Stretching, Straightening, Exercise, Kneeling, Squatting, Standing, Walking, Stairs, Other (Comment)  Limiting Behavior: Yes  Relieving Factors: Rest, Nsaids, Other (Comment)  Result of Injury: No  Work-Related Injury: No  Are there other pain locations you wish to document?: No    Review of Systems  No new rashes  No numbness  No tingling  No fever  No depression  No new pain patterns  Pertinent items are noted in HPI  Review of systems reviewed from Patient History Form dated on 7/22/2020 and available in the patient's chart under the Media tab. No change in the patient's medical history form. Examination:  General Exam:    Vitals: Temperature 97.8 °F (36.6 °C), height 5' 2\" (1.575 m), weight 222 lb (100.7 kg), not currently breastfeeding.   BMI Readings from Last 3 Encounters:   07/22/20 40.60 kg/m²   03/25/20 40.60 kg/m²   03/07/19 40.60 kg/m² Constitutional: Patient is adequately groomed with no evidence of malnutrition  Mental Status: The patient is alert and oriented to time, place and person. The patient's mood and affect are appropriate. Lymphatic: The lymphatic examination bilaterally reveals all areas to be without enlargement or induration. Vascular: Examination reveals no swelling or calf tenderness. Peripheral pulses are palpable and 2+. Neurological: The patient has good coordination. There is no weakness or sensory deficit. Skin:    Head/Neck: inspection reveals no rashes, ulcerations or lesions. Trunk:  inspection reveals no rashes, ulcerations or lesions. Right Lower Extremity: inspection reveals no rashes, ulcerations or lesions. Left Lower Extremity: inspection reveals no rashes, ulcerations or lesions. PHYSICAL EXAM:      Knee Examination  Inspection:  No abrasions no lacerations no signs of infection or obvious deformity no obvious swelling and joint effusion. Palpation:   Palpation reveals no pain medial joint line,   No lateral joint line pain, no joint effusion. Range of Motion: 0-140 degrees flexion/ extension   Hip extension to 20 hip flexion to 70+  Lumbar ROM -20 extension flexion to 6 inches from the floor. Strength: Quadriceps testing 5/5, hamstring muscle testing 5/5, EHL against resistance is 5/5, hip flexor strength is intact 5/5, internal and external rotation of the hip against resistance is also intact 5/5.     Special Tests: stable Lachman, negative anterior drawer, negative pivot shift, no posterior sag no posterior drawer does not open to valgus or varus stress at 0 or 30°, Steinmann's negative, Alem's negative, Homans negative Markell negative, pedal pulses are +2/4 capillary refill is brisk sensation is intact ankle exam and hip exam are shows no pain with full range of motion provocative testing of the hip is nonpainful muscle testing around the hip is 5 over 5. Lumbar flexion to 6 inches from floor without pain. She does have pain to palpation of the left lumbar spine she has pain with flexion of the lumbar spine she has pain with a straight leg raise she has no pain with hip provocative testing EHL is 5/5- Homans negative Markell negative Mcnulty's test    Gait: antalgic     Reflex: Intact  Lower extremity Deep tendon reflexes +2/4 and equal bilaterally for patella and Achilles. Upper extremity reflexes:  of the biceps, triceps, brachioradialis +2/4 equal bilaterally. Contralateral  Knee: Negative Lachman negative anterior drawer negative pivot shift no posterior sag no posterior drawer does not open to valgus or varus stress at 0 or 30° negative Steinmann's negative Alem's negative Homans negative Markell pedal pulses are +2/4 capillary refill is brisk sensation is intact ankle exam and hip exam are shows no pain with full range of motion provocative testing of the hip is nonpainful muscle testing around the hip is 5 over 5. Hip and lumbar testing does not reproduce pain evocative testing does not reproduce symptomatology. Additional Examinations:  Right Lower Extremity: Examination of the right lower extremity does not show any tenderness, deformity or injury. Range of motion is unremarkable. There is no gross instability. There are no rashes, ulcerations or lesions. Strength and tone are normal.  Left Lower Extremity: Examination of the left lower extremity does not show any tenderness, deformity or injury. Range of motion is unremarkable. There is no gross instability. There are no rashes, ulcerations or lesions. Strength and tone are normal.  Neck: Examination of the neck does not show any tenderness, deformity or injury. Range of motion is unremarkable. There is no gross instability. There are no rashes, ulcerations or lesions.   Strength and tone are normal.    Past Surgical History:   Procedure Laterality Date    CHOLECYSTECTOMY

## 2020-08-31 ENCOUNTER — HOSPITAL ENCOUNTER (OUTPATIENT)
Dept: PHYSICAL THERAPY | Age: 75
Setting detail: THERAPIES SERIES
Discharge: HOME | End: 2020-08-31
Payer: MEDICARE

## 2020-08-31 PROCEDURE — 97162 PT EVAL MOD COMPLEX 30 MIN: CPT

## 2020-08-31 PROCEDURE — 97110 THERAPEUTIC EXERCISES: CPT

## 2020-08-31 PROCEDURE — 97140 MANUAL THERAPY 1/> REGIONS: CPT

## 2020-08-31 NOTE — FLOWSHEET NOTE
[address] [address]  Phone: [phone]   Fax:     [phone]    Physical Therapy Treatment Note/ Progress Report:     Date:  2020    Patient Name:  Yamila Rodgers    :  1945  MRN: [id number]  Restrictions/Precautions:    Medical/Treatment Diagnosis Information:  · Diagnosis: L knee pain, lumbar radiculopathy  · Treatment Diagnosis: L knee pain M25.562, LBP P68.9  Insurance/Certification information:  PT Insurance Information: Medicare/Medical [place]  Physician Information:  Referring Practitioner: Karthikeyan Maya of care signed (Y/N):     Date of Patient follow up with Physician:      Progress Report: [x]  Yes  []  No     Date Range for reporting period:  Beginnin2020  Ending: -    Progress report due (10 Rx/or 30 days whichever is less): 8167     Recertification due (POC duration/ or 90 days whichever is less):2020     Visit # Insurance Allowable Auth Needed   1 Medicare/Med [place] []Yes    [x]No     Pain level:  4-5/10   Functional Scale: LEFS 70% disability   Date Assessed: 2020    SUBJECTIVE:  See eval    OBJECTIVE: See eval   Observation:    Test measurements:      RESTRICTIONS/PRECAUTIONS: none    Exercises/Interventions:     Therapeutic Ex (11872)   Min: 12 min sets/sec reps notes   Hip ER stretch 30 4    Hamstring stretch 30 4          Hip Ext      Bridge  2 10    Kneeling Alt Arm-Leg      Side lying LB rot      Front Plank      Side planks       Kneeling hip abd/ext      1/2 kneeling down chop      Std band pull down      SL hip abd/clam      Lateral band pull      Lateral band walk      Bosu Lunge      Slide lunge       Ham string stretch      Hip Flex stretch      Glute Stretch      SLS Ball/wall glute      Manual Intervention (22948) Min: 15 min      DN      Prone PA      GISTM/STM      Lumbar Manip      SI Manip      Hip belt mobs 1 8    Hip LA distraction      IASTM 1 7 tissue extensibility and allowing for proper ROM for normal function with self care, mobility, lifting and ambulation. Modalities:       Charges:  Timed Code Treatment Minutes: 27   Total Treatment Minutes: 60     [] EVAL (LOW) 80577 (typically 20 minutes face-to-face)  [x] EVAL (MOD) 31192 (typically 30 minutes face-to-face)  [] EVAL (HIGH) 95446 (typically 45 minutes face-to-face)  [] RE-EVAL     [x] UO(58274) x  1   [] DRY NEEDLE 1 OR 2 MUSCLES  [] NMR (10095) x     [] DRY NEEDLE 3+ MUSCLES  [x] Manual (58809) x   1    [] TA (74923) x     [] Mech Traction (35260)  [] ES(attended) (63196)     [] ES (un) (44880):   [] VASO (36237)  [] Other:    If BWC Please Indicate Time In/Out  CPT Code Time in Time out                                     GOALS:  Patient stated goal: reduce leg pain   [] Progressing: [] Met: [] Not Met: [] Adjusted  Therapist goals for Patient:   Short Term Goals: To be achieved in: 2 weeks  1. Independent in HEP and progression per patient tolerance, in order to prevent re-injury. [] Progressing: [] Met: [] Not Met: [] Adjusted  2. Patient will have a decrease in pain to facilitate improvement in movement, function, and ADLs as indicated by Functional Deficits. [] Progressing: [] Met: [] Not Met: [] Adjusted    Long Term Goals: To be achieved in: 4 weeks  1. Disability index score of 60% or less for the KB to assist with reaching prior level of function. [] Progressing: [] Met: [] Not Met: [] Adjusted  2. Patient will demonstrate increased AROM to WNL, good LS mobility, good hip ROM to allow for proper joint functioning as indicated by patients Functional Deficits. [] Progressing: [] Met: [] Not Met: [] Adjusted  3. Patient will demonstrate an increase in Strength to good proximal hip and core activation to allow for proper functional mobility as indicated by patients Functional Deficits. [] Progressing: [] Met: [] Not Met: [] Adjusted  4.  Patient will return to sitting functional activities without increased symptoms or restriction. [] Progressing: [] Met: [] Not Met: [] Adjusted  5. Patient will be able to ascend/descend stairs reciprocally without increased symptoms or restriction. (patient specific functional goal)     ASSESSMENT:  See eval    Treatment/Activity Tolerance:  [x] Patient tolerated treatment well [] Patient limited by fatique  [] Patient limited by pain  [] Patient limited by other medical complications  [] Other:     Overall Progression Towards Functional goals/ Treatment Progress Update:  [] Patient is progressing as expected towards functional goals listed. [] Progression is slowed due to complexities/Impairments listed. [] Progression has been slowed due to co-morbidities. [x] Plan just implemented, too soon to assess goals progression <30days   [] Goals require adjustment due to lack of progress  [] Patient is not progressing as expected and requires additional follow up with physician  [] Other:    Prognosis for POC: [x] Good [] Fair  [] Poor    Patient requires continued skilled intervention: [x] Yes  [] No        PLAN: See eval  [] Continue per plan of care [] Alter current plan (see comments)  [x] Plan of care initiated [] Hold pending MD visit [] Discharge    Electronically signed by: Gopi Giron PT    Note: If patient does not return for scheduled/recommended follow up visits, this note will serve as a discharge from care along with the most recent update on progress.

## 2020-08-31 NOTE — PLAN OF CARE
[address] [address]  Phone: [phone]   Fax:     [phone]                                                       Physical Therapy Certification    Dear Referring Practitioner: Corinne Gray,    We had the pleasure of evaluating the following patient for physical therapy services at [address] [place] [address]. A summary of our findings can be found in the initial assessment below. This includes our plan of care. If you have any questions or concerns regarding these findings, please do not hesitate to contact me at the office phone number checked above. Thank you for the referral.       Physician Signature:_______________________________Date:__________________  By signing above (or electronic signature), therapists plan is approved by physician            Patient: Marianna Leventhal   : 1945   MRN: [id number]  Referring Physician: Referring Practitioner: Corinne Gray      Evaluation Date: 2020      Medical Diagnosis Information:  Diagnosis: L knee pain, lumbar radiculopathy   Treatment Diagnosis: L knee pain M25.562, LBP M54.5                                         Insurance information: PT Insurance Information: Medicare/Medical [place]     Precautions/ Contra-indications: none  Latex Allergy:  [x]NO      []YES  Preferred Language for Healthcare:   [x]English       []other:    C-SSRS Triggered by Intake questionnaire (Past 2 wk assessment ):   [x] No, Questionnaire did not trigger screening.   [] Yes, Patient intake triggered C-SSRS Screening      [] C-SSRS Screening completed  [] PCP notified via Epic     SUBJECTIVE: Patient stated complaint:Patient reports that she has been having pain in back of L knee a few months ago. Had imaging done and was negative for bakers cyst, found to have OA in L knee. However, since that time she had an additional follow up with orthopedic MD and believes to be from back. States that she has had worsening to s/s into back of thigh and at times into lower leg. Feels like leg gets worse as the day goes on. Can sit for about 30 mins before she has to get up and move. Is better with standing. Has difficulty with sleeping, stairs, walking and squatting.      Relevant Medical History:n/a  Functional Scale/Score:LEFS 70% disability    Pain Scale: 4-5/10  Easing factors: not known  Provocative factors: sitting for extended periods     Type: []Constant   [x]Intermittent  []Radiating []Localized []other:     Numbness/Tingling: feet 2* DM    Occupation/School: retried nurse    Living Status/Prior Level of Function: Independent with ADLs and IADLs    OBJECTIVE:   Repeated Movements:not tested today    ROM  Comments   Lumbar Flex     Lumbar Ext       ROM LEFT RIGHT Comments   Lumbar Side Bend      Lumbar Rotation      Quadrant      Hip Flexion 110 117    Hip Abd      Hip ER 50 50    Hip IR 20 30    Hip Extension      Knee Ext      Knee Flex      Hamstring Flex 70 65    Piriformis      Sarthak test                Myotomes/Strength Normal Abnormal Comments   []ALL NORMAL      Hip Abd   limited   Hip Ext   limited   Hip flexion (L1-L2 femoral) [] [] limited   Knee extension (L2-L4 femoral) [] []    Knee flexion (S1 sciatic)      Dorsiflexion (L4-L5 deep peroneal) [] []    Great Toe Ext (L5 deep peroneal nerve) [] []    Ankle Eversion (S1-S2 super peroneal) [] []    Ankle PF(S1-S2 tibial) [] []    Multifidus [] []    Transverse Ab [] []      Dermatomes Normal Abnormal Comments   [x]ALL NORMAL            inguinal area (L1)  [] []    anterior mid-thigh (L2) [] []    distal ant thigh/med knee (L3) [] []    medial lower leg and foot (L4) [] []    lateral lower leg and foot (L5) [] []    posterior calf (S1) [] []    medial calcaneus (S2) [] []      Reflexes Normal Abnormal Comments   [x]ALL NORMAL            S1-2 Seated achilles [] []    S1-2 Prone knee bend [] []    L3-4 Patellar tendon [] []    C5-6 Biceps [] []    C6 Brachioradialis [] []    C7-8 Triceps [] []    Clonus [] []    Babinski [] []    Multani's [] []      Joint mobility: L hip   []Normal    [x]Hypo   []Hyper    Palpation: tender to palpation along L L4/5, L5/S1, glutes, ischial tuberosity and medial hamstring belly to insertion    Functional Mobility/Transfers: limited in sitting for longer than 30 min, driving, walking, sleeping    Posture: increased l/s extension in standing    Gait: (include devices/WB status) wide GE, tender and gingerly walking on feet    Bandages/Dressings/Incisions: NA    Neurodynamics: WNL    Orthopedic Special Tests:    Neural dynamic tension testing Normal Abnormal Comments   Slump Test  - Degree of knee flexion:  [] []    SLR  [] []    0-30 [] []    30-70 [] []    Femoral nerve (L2-4) [] []       Normal Abnormal N/A Comments   Fwd Bend-aberrant or innominate mvmt) [] [] []    Trendelenburg [] [] []    Kemps/Quadrant [] [] []    Charmaine Grave [] [] []    YESY/Reese [] [] []    Hip scour [] [] []    Supine to sit [] [] []    Prone knee bend [] [] []           Hip thrust [] [] []    SI distraction/compression [] [] []    Sacral Spring/thrust [] [] []               [x] Patient history, allergies, meds reviewed. Medical chart reviewed. See intake form. Review Of Systems (ROS):  [x]Performed Review of systems (Integumentary, CardioPulmonary, Neurological) by intake and observation. Intake form has been scanned into medical record. Patient has been instructed to contact their primary care physician regarding ROS issues if not already being addressed at this time.       Co-morbidities/Complexities (which will affect course of rehabilitation):   []None           Arthritic conditions   []Rheumatoid arthritis (M05.9)  [x]Osteoarthritis (M19.91)   Cardiovascular conditions   [x]Hypertension (I10)  []Hyperlipidemia (E78.5)  []Angina pectoris (I20)  []Atherosclerosis (I70)  [x]CVA Musculoskeletal conditions   []Disc pathology []Congenital spine pathologies   []Prior surgical intervention  []Osteoporosis (M81.8)  [x]Osteopenia (M85.8)   Endocrine conditions   []Hypothyroid (E03.9)  []Hyperthyroid Gastrointestinal conditions   []Constipation (W13.98)   Metabolic conditions   [x]Morbid obesity (E66.01)  [x]Diabetes type 1(E10.65) or 2 (E11.65)   [x]Neuropathy (G60.9)     Pulmonary conditions   []Asthma (J45)  []Coughing   []COPD (J44.9)   Psychological Disorders  []Anxiety (F41.9)  []Depression (F32.9)   []Other:   []Other:           Barriers to/and or personal factors that will affect rehab potential:              []Age  []Sex    []Smoker              []Motivation/Lack of Motivation                        [x]Co-Morbidities              []Cognitive Function, education/learning barriers              []Environmental, home barriers              []profession/work barriers  []past PT/medical experience  []other:  Justification:     Falls Risk Assessment (30 days):   [x] Falls Risk assessed and no intervention required. [] Falls Risk assessed and Patient requires intervention due to being higher risk   TUG score (>12s at risk):     [] Falls education provided, including:         ASSESSMENT: Patient is a 77 yo female who presents to therapy with ongoing L thigh discomfort that she reports started in her knee and then has progressed to thigh and calf. Upon assessment, patient with limited lumbar and L hip mobility, decreased core and proximal hip strength; as well as increased soft tissue restriction in L hamstring belly and insertion into medial knee. Patient tolerated manual therapy for soft tissue mobilization as well as stretching. No reports of discomfort into leg at conclusion- just reporting mild discomfort into lumbar spine region. Patient to benefit from continued skilled PT services to address deficits.      Functional Impairments:     [x]Noted lumbar/proximal hip hypomobility   []Noted lumbosacral and/or generalized hypermobility   [x]Decreased Lumbosacral/hip/LE functional ROM   [x]Decreased core/proximal hip strength and neuromuscular control    [x]Decreased LE functional strength    []Abnormal reflexes/sensation/myotomal/dermatomal deficits  []Reduced balance/proprioceptive control    []other:      Functional Activity Limitations (from functional questionnaire and intake)   [x]Reduced ability to tolerate prolonged functional positions   []Reduced ability or difficulty with changes of positions or transfers between positions   []Reduced ability to maintain good posture and demonstrate good body mechanics with sitting, bending, and lifting   [x]Reduced ability to sleep   [x] Reduced ability or tolerance with driving and/or computer work   []Reduced ability to perform lifting, reaching, carrying tasks   [x]Reduced ability to squat   []Reduced ability to forward bend   [x]Reduced ability to ambulate prolonged functional periods/distances/surfaces   [x]Reduced ability to ascend/descend stairs   []other:       Participation Restrictions   []Reduced participation in self care activities   [x]Reduced participation in home management activities   []Reduced participation in work activities   [x]Reduced participation in social activities. []Reduced participation in sport/recreational activities. Classification:   []Signs/symptoms consistent with Lumbar instability/stabilization subgroup. [x]Signs/symptoms consistent with Lumbar mobilization/manipulation subgroup, myotomes and dermatomes intact. Meets manipulation criteria.     []Signs/symptoms consistent with Lumbar direction specific/centralization subgroup   []Signs/symptoms consistent with Lumbar traction subgroup       [x]Signs/symptoms consistent with lumbar facet dysfunction   []Signs/symptoms consistent with lumbar stenosis type dysfunction   []Signs/symptoms consistent with nerve root involvement including myotome & dermatome dysfunction   []Signs/symptoms consistent Dry Needling/IASTM, STM, PROM, Gr I-IV mobilizations, manipulation. [x]  Modalities as needed that may include: thermal agents, E-stim, Biofeedback, US, iontophoresis as indicated  [x]  Patient education on joint protection, postural re-education, activity modification, progression of HEP. HEP instruction: hip ER stretch, hamstring stretch, bridge (see scanned forms)    GOALS:  Patient stated goal: reduce leg pain   [] Progressing: [] Met: [] Not Met: [] Adjusted  Therapist goals for Patient:   Short Term Goals: To be achieved in: 2 weeks  1. Independent in HEP and progression per patient tolerance, in order to prevent re-injury. [] Progressing: [] Met: [] Not Met: [] Adjusted  2. Patient will have a decrease in pain to facilitate improvement in movement, function, and ADLs as indicated by Functional Deficits. [] Progressing: [] Met: [] Not Met: [] Adjusted    Long Term Goals: To be achieved in: 4 weeks  1. Disability index score of 60% or less for the KB to assist with reaching prior level of function. [] Progressing: [] Met: [] Not Met: [] Adjusted  2. Patient will demonstrate increased AROM to WNL, good LS mobility, good hip ROM to allow for proper joint functioning as indicated by patients Functional Deficits. [] Progressing: [] Met: [] Not Met: [] Adjusted  3. Patient will demonstrate an increase in Strength to good proximal hip and core activation to allow for proper functional mobility as indicated by patients Functional Deficits. [] Progressing: [] Met: [] Not Met: [] Adjusted  4. Patient will return to sitting functional activities without increased symptoms or restriction. [] Progressing: [] Met: [] Not Met: [] Adjusted  5.  Patient will be able to ascend/descend stairs reciprocally without increased symptoms or restriction. (patient specific functional goal)    [] Progressing: [] Met: [] Not Met: [] Adjusted     Electronically signed by:  Tommy Goodwin PT

## 2020-09-02 ENCOUNTER — OFFICE VISIT (OUTPATIENT)
Dept: ORTHOPEDIC SURGERY | Age: 75
End: 2020-09-02
Payer: MEDICARE

## 2020-09-02 VITALS — WEIGHT: 222 LBS | HEIGHT: 62 IN | BODY MASS INDEX: 40.85 KG/M2

## 2020-09-02 PROBLEM — M54.16 LUMBAR RADICULOPATHY: Status: ACTIVE | Noted: 2020-09-02

## 2020-09-02 PROCEDURE — 1090F PRES/ABSN URINE INCON ASSESS: CPT | Performed by: ORTHOPAEDIC SURGERY

## 2020-09-02 PROCEDURE — G8400 PT W/DXA NO RESULTS DOC: HCPCS | Performed by: ORTHOPAEDIC SURGERY

## 2020-09-02 PROCEDURE — G8417 CALC BMI ABV UP PARAM F/U: HCPCS | Performed by: ORTHOPAEDIC SURGERY

## 2020-09-02 PROCEDURE — 4040F PNEUMOC VAC/ADMIN/RCVD: CPT | Performed by: ORTHOPAEDIC SURGERY

## 2020-09-02 PROCEDURE — 99214 OFFICE O/P EST MOD 30 MIN: CPT | Performed by: ORTHOPAEDIC SURGERY

## 2020-09-02 PROCEDURE — G8427 DOCREV CUR MEDS BY ELIG CLIN: HCPCS | Performed by: ORTHOPAEDIC SURGERY

## 2020-09-02 PROCEDURE — 1123F ACP DISCUSS/DSCN MKR DOCD: CPT | Performed by: ORTHOPAEDIC SURGERY

## 2020-09-02 PROCEDURE — 3017F COLORECTAL CA SCREEN DOC REV: CPT | Performed by: ORTHOPAEDIC SURGERY

## 2020-09-02 PROCEDURE — 1036F TOBACCO NON-USER: CPT | Performed by: ORTHOPAEDIC SURGERY

## 2020-09-02 NOTE — PROGRESS NOTES
9/2/20  History of Present Illness:  Bry Watts is a 76 y.o. female recheck evaluation lumbar spine with left leg radiculopathy doing much better but still having some discomfort she just got into therapy 1 day    Chief complaint that brought the patient in the office today: Left lumbar radiculopathy      Location she gets some greater trochanteric pain but most of this is L5 and left leg  Severity improving but still 5 out of 10  Duration several weeks  Associated sign/symptoms pain with increased activity    I have reviewed and discussed the below Pain assessment findings with the patient. Medical History  Patient's medications, allergies, past medical, surgical, social and family histories were reviewed and updated as appropriate.     Past Medical History:   Diagnosis Date    Arthritis     Asthma     Diabetes mellitus ([facility] [address]     Hyperlipidemia     Hypertension     Reflux     Sleep apnea     cpap    Thyroid disease      Family History   Problem Relation Age of Onset    Hypertension Mother     Cancer Father      Social History     Socioeconomic History    Marital status:      Spouse name: None    Number of children: None    Years of education: None    Highest education level: None   Occupational History    None   Social Needs    Financial resource strain: None    Food insecurity     Worry: None     Inability: None    Transportation needs     Medical: None     Non-medical: None   Tobacco Use    Smoking status: Never Smoker    Smokeless tobacco: Never Used   Substance and Sexual Activity    Alcohol use: Yes     Comment: social    Drug use: No    Sexual activity: None   Lifestyle    Physical activity     Days per week: None     Minutes per session: None    Stress: None   Relationships    Social connections     Talks on phone: None     Gets together: None     Attends Judaism service: None     Active member of club or organization: None     Attends meetings of clubs or organizations: None     Relationship status: None    Intimate partner violence     Fear of current or ex partner: None     Emotionally abused: None     Physically abused: None     Forced sexual activity: None   Other Topics Concern    None   Social History Narrative    None     Current Outpatient Medications   Medication Sig Dispense Refill    predniSONE (DELTASONE) 10 MG tablet Days1-2:50mg PO QD,Days3-4:40mg PO QD,Days5-6:30mg PO QD,Days7-8:20mg PO QD,Days 9-10:10mg PO QD 30 tablet 0    aspirin (ASPIR-81) 81 MG EC tablet Aspir-81   take one at night      GEMFIBROZIL PO Take by mouth      loratadine (CLARITIN) 10 MG tablet Claritin 10 mg tablet   Take 1 tablet every day by oral route in the morning.  guaiFENesin (MUCINEX MAXIMUM STRENGTH) 1200 MG TB12 Mucinex 1,200 mg tablet, extended release   Take 1 tablet twice a day by oral route.  pravastatin (PRAVACHOL) 20 MG tablet pravastatin 20 mg tablet   Take 1 tablet every day by oral route.  Probiotic Product (PROBIOTIC-10 PO) Probiotic   1 x day      NUTRITIONAL SUPPLEMENTS PO Take by mouth      MULTIPLE VITAMINS-MINERALS ER PO Take by mouth      Magnesium 100 MG CAPS magnesium   500mg one daily      Multiple Minerals-Vitamins (CITRACAL PLUS BONE DENSITY PO) Citracal + Bone Density   2 scoops daily      Coenzyme Q10 10 MG CAPS Take 1 capsule by mouth      Cranberry-Vitamin C-Probiotic (AZO CRANBERRY) 250-30 MG TABS Azo Cranberry + Probiotic      B COMPLEX VITAMINS ER PO Take 2 tablets by mouth      Omega-3 Fatty Acids (FISH OIL PO) Take by mouth      Omega-3 Fatty Acids (FISH OIL) 1000 MG CAPS Fish Oil   500mg  3 x day      atenolol (TENORMIN) 50 MG tablet Take 50 mg by mouth daily.  cyclobenzaprine (FLEXERIL) 10 MG tablet Take 10 mg by mouth.  insulin lispro (HUMALOG) 100 UNIT/ML pen Inject  into the skin.  insulin NPH (HUMULIN N) 100 UNIT/ML injection vial Inject  into the skin.       levothyroxine (SYNTHROID) 100 MCG tablet Take 100 mcg by mouth.  lisinopril (PRINIVIL;ZESTRIL) 20 MG tablet Take 20 mg by mouth.  metFORMIN (GLUCOPHAGE) 500 MG tablet Take 500 mg by mouth.  nateglinide (STARLIX) 120 MG tablet Take 120 mg by mouth.  omeprazole (PRILOSEC) 40 MG capsule Take 40 mg by mouth.  triamterene-hydrochlorothiazide (DYAZIDE) 37.5-25 MG per capsule Take 1 capsule by mouth.  zolpidem (AMBIEN) 10 MG tablet Take 15 mg by mouth. No current facility-administered medications for this visit. Allergies   Allergen Reactions    Atorvastatin Other (See Comments)    Adhesive Tape Rash    Codeine Nausea And Vomiting and Nausea Only    Sulfa Antibiotics Other (See Comments) and Rash       REVIEW OF SYSTEMS:   No rashes today  No new numbness  No tingling  No fevers  No depression  No new onset of pain      Pertinent items are noted in HPI  Review of systems reviewed from Patient History Form dated on 7/22/2020 and available in the patient's chart under the Media tab. Examination:    Vitals: Height 5' 2\" (1.575 m), weight 222 lb (100.7 kg), not currently breastfeeding. BMI Readings from Last 3 Encounters:   09/02/20 40.60 kg/m²   07/22/20 40.60 kg/m²   03/25/20 40.60 kg/m²       LUMBAR/SACRAL EXAMINATION:  · Inspection: Local inspection shows no step-off or bruising. Lumbar alignment is normal. No instability is noted. · Constitutional: Patient is adequately groomed with no evidence of malnutrition  · Lymphatic: The lymphatic examination bilaterally reveals all areas to be without enlargement or induration. · Mental Status: The patient is oriented to time, place and person. The patient's mood and   affect are appropriate. · Vascular: Examination reveals no swelling or calf tenderness. Peripheral pulses are palpable and 2+. · Palpation:   No evidence of tenderness at the midline.  Lumbar paraspinal tenderness Mild L4/5 and L5/S1 tenderness  Bursal tenderness Left greater trochanteric tenderness  There is no paraspinal spasm. · Range of Motion: limited by 25% in all planes due to pain  · Strength:   Strength testing is 5/5 in all muscle groups tested. · Special Tests:   Straight leg raise and crossed SLR negative. Reese's testing is negative bilaterally. FADIR's testing is negative bilaterally. Slump test negative. Bowstring test negative  · Skin: There are no rashes, ulcerations or lesions. · Reflexes: Reflexes are symmetrically 2+ at the patellar and ankle tendons. Clonus absent bilaterally at the feet. · Gait & station: antalgic on the left and no ataxia    · Additional Examinations:    · RIGHT LOWER EXTREMITY: Inspection/examination of the right lower extremity does not show any tenderness, deformity or injury. Range of motion is unremarkable. There is no gross instability. There are no rashes, ulcerations or lesions. Strength and tone are normal. No atrophy or abnormal movements are noted. · LEFT LOWER EXTREMITY:  Inspection/examination of the left lower extremity does not show any tenderness, deformity or injury. Range of motion is unremarkable. There is no gross instability. There are no rashes, ulcerations or lesions. Strength and tone are normal. No atrophy or abnormal movements are noted. Associated signs and symptoms:   Neurogenic bowel or bladder symptoms:  no   Perceived weakness:  yes   Difficulty walking:  yes    Additional Comments: Straight leg raise on the left is mildly bothersome but she has pain with external rotation of the hip and palpation of the greater trochanter with pain at the L5-S1 area      MRI: I would like to get an MRI of her lumbar spine to evaluate for future epidural injection  Labs: none at this time    No results found.       Past Surgical History:   Procedure Laterality Date    CHOLECYSTECTOMY      COLONOSCOPY      SHOULDER ARTHROSCOPY Right 4/21/15    SUBACROMIAL DECOMPRESSION, DISTAL CLAVICLE EXCISION, discussed  Minor ways to treat back pain  For further information regarding the spine conditions and to review interventional treatments the patient was directed to [employer].    6.  Follow up:  after MRI    Alma Buenrostro was instructed to call the office if her symptoms worsen or if new symptoms appear prior to the next scheduled visit. She is specifically instructed to contact the office between now & her scheduled appointment if she has concerns related to her condition or if she needs assistance in scheduling the above tests. She is   welcome to call for an appointment sooner if she has any additional concerns or questions. Sam Romero D.O. [address] [place] [address] and Sports Medicine  [facility]  Board Certified  [employer] Team Physician      Disclaimer: \"This note was dictated with voice recognition software. Though review and correction are routine, we apologize for any errors. \"

## 2020-09-03 ENCOUNTER — HOSPITAL ENCOUNTER (OUTPATIENT)
Dept: PHYSICAL THERAPY | Age: 75
Setting detail: THERAPIES SERIES
Discharge: HOME | End: 2020-09-03
Payer: MEDICARE

## 2020-09-03 PROCEDURE — 97110 THERAPEUTIC EXERCISES: CPT

## 2020-09-03 PROCEDURE — 97140 MANUAL THERAPY 1/> REGIONS: CPT

## 2020-09-03 NOTE — FLOWSHEET NOTE
[address]  Phone: [phone]   Fax:     [phone]    Physical Therapy Treatment Note/ Progress Report:     Date:  9/3/2020    Patient Name:  Daksha Cunha    :  1945  MRN: [id number]  Restrictions/Precautions:    Medical/Treatment Diagnosis Information:  · Diagnosis: L knee pain, lumbar radiculopathy  · Treatment Diagnosis: L knee pain M25.562, LBP P79.0  Insurance/Certification information:  PT Insurance Information: [place]  Physician Information:  Referring Practitioner: Sherrie Hsieh of care signed (Y/N):     Date of Patient follow up with Physician:      Progress Report: [x]  Yes  []  No     Date Range for reporting period:  Beginnin2020  Ending: -    Progress report due (10 Rx/or 30 days whichever is less): 3/32/4462     Recertification due (POC duration/ or 90 days whichever is less):2020     Visit # Insurance Allowable Auth Needed   2 [place] []Yes    [x]No     Pain level:  4-5/10   Functional Scale: LEFS 70% disability   Date Assessed: 2020    SUBJECTIVE:  Patient reports she isn't feeling great today and experiencing some discomfort in L hip. Pt reports doctor ordering MRI for back during appt yesterday.     OBJECTIVE:    Observation:    Test measurements:      RESTRICTIONS/PRECAUTIONS: none    Exercises/Interventions:     Therapeutic Ex (95158)   Min: 16 min sets/sec reps notes   Hip ER stretch 30 4    Hamstring stretch- contract relax 30 4    SL rotation stretch 10 10    Hip Ext      Bridge  3-5sec 20    Kneeling Alt Arm-Leg      LTR 1 10    Front Plank      Side planks       Kneeling hip abd/ext      1/2 kneeling down chop      Std band pull down      SL hip abd/clam      Lateral band pull      Lateral band walk      Bosu Lunge      Slide lunge       Ham string stretch      Hip Flex stretch      Glute Stretch      SLS Ball/wall glute Manual Intervention (68164) Min: 26 min      DN      Prone PA 1 8 L/s and SI jt   GISTM/STM      Lumbar Manip      SI Manip      Hip belt mobs 1 8    Hip LA distraction      IASTM 1 10 L SI jt/glutes and Medial hamstring         NMR re-education (40803)   Min:      Mf Activation- re-ed      TrA Re-ed activation      Glute Max re-ed activation      Prone ira Rodgers            Therapeutic Activity (92659) Min:                                Therapeutic Exercise and NMR EXR  [x] (68446) Provided verbal/tactile cueing for activities related to strengthening, flexibility, endurance, ROM  for improvements in proximal hip and core control with self care, mobility, lifting and ambulation. [x] (64789) Provided verbal/tactile cueing for activities related to improving balance, coordination, kinesthetic sense, posture, motor skill, proprioception  to assist with core control in self care, mobility, lifting, and ambulation.      Therapeutic Activities:    [] (50143 or 28512) Provided verbal/tactile cueing for activities related to improving balance, coordination, kinesthetic sense, posture, motor skill, proprioception and motor activation to allow for proper function  with self care and ADLs  [] (32335) Provided training and instruction to the patient for proper core and proximal hip recruitment and positioning with ambulation re-education     Home Exercise Program:    [x] (02720) Reviewed/Progressed HEP activities related to strengthening, flexibility, endurance, ROM of core, proximal hip and LE for functional self-care, mobility, lifting and ambulation   [] (94742) Reviewed/Progressed HEP activities related to improving balance, coordination, kinesthetic sense, posture, motor skill, proprioception of core, proximal hip and LE for self care, mobility, lifting, and ambulation      Manual Treatments:  PROM / STM / Oscillations-Mobs:  G-I, II, III, IV (PA's, Inf., Post.)  [x] (27166) Provided manual therapy to mobilize proximal hip and LS spine soft tissue/joints for the purpose of modulating pain, promoting relaxation,  increasing ROM, reducing/eliminating soft tissue swelling/inflammation/restriction, improving soft tissue extensibility and allowing for proper ROM for normal function with self care, mobility, lifting and ambulation. Modalities:       Charges:  Timed Code Treatment Minutes: 42   Total Treatment Minutes: 44     [] EVAL (LOW) 95290 (typically 20 minutes face-to-face)  [] EVAL (MOD) 19407 (typically 30 minutes face-to-face)  [] EVAL (HIGH) 98041 (typically 45 minutes face-to-face)  [] RE-EVAL     [x] JT(80050) x  1   [] DRY NEEDLE 1 OR 2 MUSCLES  [] NMR (84925) x     [] DRY NEEDLE 3+ MUSCLES  [x] Manual (56175) x   2    [] TA (79442) x     [] Mech Traction (33737)  [] ES(attended) (13645)     [] ES (un) (60520):   [] VASO (15228)  [] Other:    If [facility] Please Indicate Time In/Out  CPT Code Time in Time out                                     GOALS:  Patient stated goal: reduce leg pain   [] Progressing: [] Met: [] Not Met: [] Adjusted  Therapist goals for Patient:   Short Term Goals: To be achieved in: 2 weeks  1. Independent in HEP and progression per patient tolerance, in order to prevent re-injury. [] Progressing: [] Met: [] Not Met: [] Adjusted  2. Patient will have a decrease in pain to facilitate improvement in movement, function, and ADLs as indicated by Functional Deficits. [] Progressing: [] Met: [] Not Met: [] Adjusted    Long Term Goals: To be achieved in: 4 weeks  1. Disability index score of 60% or less for the KB to assist with reaching prior level of function. [] Progressing: [] Met: [] Not Met: [] Adjusted  2. Patient will demonstrate increased AROM to WNL, good LS mobility, good hip ROM to allow for proper joint functioning as indicated by patients Functional Deficits. [] Progressing: [] Met: [] Not Met: [] Adjusted  3.  Patient will demonstrate an increase in Strength to good proximal hip and core activation to allow for proper functional mobility as indicated by patients Functional Deficits. [] Progressing: [] Met: [] Not Met: [] Adjusted  4. Patient will return to sitting functional activities without increased symptoms or restriction. [] Progressing: [] Met: [] Not Met: [] Adjusted  5. Patient will be able to ascend/descend stairs reciprocally without increased symptoms or restriction. (patient specific functional goal)     ASSESSMENT:  Patient continues with increased restriction in distal medial hamstring. Added further work to lumbar spine today to open L side L5/S1. Most tender along SI joint of L. Did well with exercises. Added SL lumbar rotation for HEP. Treatment/Activity Tolerance:  [x] Patient tolerated treatment well [] Patient limited by fatique  [] Patient limited by pain  [] Patient limited by other medical complications  [] Other:     Overall Progression Towards Functional goals/ Treatment Progress Update:  [] Patient is progressing as expected towards functional goals listed. [] Progression is slowed due to complexities/Impairments listed. [] Progression has been slowed due to co-morbidities. [x] Plan just implemented, too soon to assess goals progression <30days   [] Goals require adjustment due to lack of progress  [] Patient is not progressing as expected and requires additional follow up with physician  [] Other:    Prognosis for POC: [x] Good [] Fair  [] Poor    Patient requires continued skilled intervention: [x] Yes  [] No        PLAN: See eval  [] Continue per plan of care [] Alter current plan (see comments)  [x] Plan of care initiated [] Hold pending MD visit [] Discharge    Electronically signed by: Rosa Crum PT    Note: If patient does not return for scheduled/recommended follow up visits, this note will serve as a discharge from care along with the most recent update on progress.

## 2020-09-09 ENCOUNTER — HOSPITAL ENCOUNTER (OUTPATIENT)
Dept: PHYSICAL THERAPY | Age: 75
Setting detail: THERAPIES SERIES
Discharge: HOME | End: 2020-09-09
Payer: MEDICARE

## 2020-09-09 PROCEDURE — 97140 MANUAL THERAPY 1/> REGIONS: CPT

## 2020-09-09 PROCEDURE — 97110 THERAPEUTIC EXERCISES: CPT

## 2020-09-09 NOTE — FLOWSHEET NOTE
[address]  Phone: [phone]   Fax:     [phone]    Physical Therapy Treatment Note/ Progress Report:     Date:  2020    Patient Name:  Marianna Leventhal    :  1945  MRN: [id number]  Restrictions/Precautions:    Medical/Treatment Diagnosis Information:  · Diagnosis: L knee pain, lumbar radiculopathy  · Treatment Diagnosis: L knee pain M25.562, LBP V10.9  Insurance/Certification information:  PT Insurance Information: Medicare/[employer] [place]  Physician Information:  Referring Practitioner: Suzi Winter of care signed (Y/N):     Date of Patient follow up with Physician:      Progress Report: [x]  Yes  []  No     Date Range for reporting period:  Beginnin2020  Ending: -    Progress report due (10 Rx/or 30 days whichever is less):      Recertification due (POC duration/ or 90 days whichever is less):2020     Visit # Insurance Allowable Auth Needed   3 [place] []Yes    [x]No     Pain level:  3-4/10   Functional Scale: LEFS 70% disability   Date Assessed: 2020    SUBJECTIVE:  Patient reports she had some soreness after last session. No real change in leg. Notes that she does better when she is on her feet- hurts more when she is sitting for extended period of time. Has not had MRI of spine done yet. Notes that she has been able to get to bed a bit faster than she had been.      OBJECTIVE:    Observation:    Test measurements:      RESTRICTIONS/PRECAUTIONS: none    Exercises/Interventions:     Therapeutic Ex (49280)   Min: 18 min sets/sec reps notes   Hip ER stretch 30 4    Hamstring stretch- contract relax 30 4    SL rotation stretch 10 10    Hip Ext      Bridge  3-5sec 20    Kneeling Alt Arm-Leg      LTR 1 10    Supine nerve gliding/hamstring 30 4    Side planks       Kneeling hip abd/ext      1/2 kneeling down chop      Std band pull down      SL hip abd/clam and LE for self care, mobility, lifting, and ambulation      Manual Treatments:  PROM / STM / Oscillations-Mobs:  G-I, II, III, IV (PA's, Inf., Post.)  [x] (73772) Provided manual therapy to mobilize proximal hip and LS spine soft tissue/joints for the purpose of modulating pain, promoting relaxation,  increasing ROM, reducing/eliminating soft tissue swelling/inflammation/restriction, improving soft tissue extensibility and allowing for proper ROM for normal function with self care, mobility, lifting and ambulation. Modalities:       Charges:  Timed Code Treatment Minutes: 44   Total Treatment Minutes: 44     [] EVAL (LOW) 52719 (typically 20 minutes face-to-face)  [] EVAL (MOD) 82630 (typically 30 minutes face-to-face)  [] EVAL (HIGH) 85605 (typically 45 minutes face-to-face)  [] RE-EVAL     [x] FZ(87281) x  1   [] DRY NEEDLE 1 OR 2 MUSCLES  [] NMR (35239) x     [] DRY NEEDLE 3+ MUSCLES  [x] Manual (62822) x   2    [] TA (16146) x     [] Mech Traction (68374)  [] ES(attended) (91049)     [] ES (un) (59269):   [] VASO (64238)  [] Other:    If [facility] Please Indicate Time In/Out  CPT Code Time in Time out                                     GOALS:  Patient stated goal: reduce leg pain   [] Progressing: [] Met: [] Not Met: [] Adjusted  Therapist goals for Patient:   Short Term Goals: To be achieved in: 2 weeks  1. Independent in HEP and progression per patient tolerance, in order to prevent re-injury. [] Progressing: [] Met: [] Not Met: [] Adjusted  2. Patient will have a decrease in pain to facilitate improvement in movement, function, and ADLs as indicated by Functional Deficits. [] Progressing: [] Met: [] Not Met: [] Adjusted    Long Term Goals: To be achieved in: 4 weeks  1. Disability index score of 60% or less for the KB to assist with reaching prior level of function. [] Progressing: [] Met: [] Not Met: [] Adjusted  2.  Patient will demonstrate increased AROM to WNL, good LS mobility, good hip ROM to allow for proper joint functioning as indicated by patients Functional Deficits. [] Progressing: [] Met: [] Not Met: [] Adjusted  3. Patient will demonstrate an increase in Strength to good proximal hip and core activation to allow for proper functional mobility as indicated by patients Functional Deficits. [] Progressing: [] Met: [] Not Met: [] Adjusted  4. Patient will return to sitting functional activities without increased symptoms or restriction. [] Progressing: [] Met: [] Not Met: [] Adjusted  5. Patient will be able to ascend/descend stairs reciprocally without increased symptoms or restriction. (patient specific functional goal)     ASSESSMENT:  Patient continues with increased restriction in distal medial hamstring and into hamstring belly. Tolerated all IASTM. Continued with opening of L L5/S1. Most tender along SI joint of L. Did well with exercises except able to reproduce s/s into distal hamstring with hooklying hip ER fall out. . Added nerve gliding for HEP. Treatment/Activity Tolerance:  [x] Patient tolerated treatment well [] Patient limited by fatique  [] Patient limited by pain  [] Patient limited by other medical complications  [] Other:     Overall Progression Towards Functional goals/ Treatment Progress Update:  [] Patient is progressing as expected towards functional goals listed. [] Progression is slowed due to complexities/Impairments listed. [] Progression has been slowed due to co-morbidities.   [x] Plan just implemented, too soon to assess goals progression <30days   [] Goals require adjustment due to lack of progress  [] Patient is not progressing as expected and requires additional follow up with physician  [] Other:    Prognosis for POC: [x] Good [] Fair  [] Poor    Patient requires continued skilled intervention: [x] Yes  [] No        PLAN: See eval  [] Continue per plan of care [] Alter current plan (see comments)  [x] Plan of care initiated [] Hold pending MD visit [] Discharge    Electronically signed by: Geoff Long PT    Note: If patient does not return for scheduled/recommended follow up visits, this note will serve as a discharge from care along with the most recent update on progress.

## 2020-09-11 ENCOUNTER — HOSPITAL ENCOUNTER (OUTPATIENT)
Dept: PHYSICAL THERAPY | Age: 75
Setting detail: THERAPIES SERIES
Discharge: HOME | End: 2020-09-11
Payer: MEDICARE

## 2020-09-15 ENCOUNTER — HOSPITAL ENCOUNTER (OUTPATIENT)
Dept: PHYSICAL THERAPY | Age: 75
Setting detail: THERAPIES SERIES
Discharge: HOME | End: 2020-09-15
Payer: MEDICARE

## 2020-09-15 PROCEDURE — 97140 MANUAL THERAPY 1/> REGIONS: CPT

## 2020-09-15 PROCEDURE — 97110 THERAPEUTIC EXERCISES: CPT

## 2020-09-15 NOTE — FLOWSHEET NOTE
[address]  Phone: [phone]   Fax:     [phone]    Physical Therapy Treatment Note/ Progress Report:     Date:  9/15/2020    Patient Name:  Kelvin Islas    :  1945  MRN: [id number]  Restrictions/Precautions:    Medical/Treatment Diagnosis Information:  · Diagnosis: L knee pain, lumbar radiculopathy  · Treatment Diagnosis: L knee pain M25.562, LBP N51.7  Insurance/Certification information:  PT Insurance Information: Medicare/Medical [place]  Physician Information:  Referring Practitioner: Joelle Ramirez of care signed (Y/N):     Date of Patient follow up with Physician:      Progress Report: [x]  Yes  []  No     Date Range for reporting period:  Beginnin2020  Ending: -    Progress report due (10 Rx/or 30 days whichever is less):      Recertification due (POC duration/ or 90 days whichever is less):2020     Visit # Insurance Allowable Auth Needed   4 Medicare/Med [place] []Yes    [x]No     Pain level:  2/10   Functional Scale: LEFS 70% disability   Date Assessed: 2020    SUBJECTIVE:  Patient reports she  Has been feeling some better. Notes that she hasn't been feeling as much in the back of the leg recently. Continues with no reports of back pain. Has been working on HEP at home.       OBJECTIVE:    Observation:    Test measurements:      RESTRICTIONS/PRECAUTIONS: none    Exercises/Interventions:     Therapeutic Ex (54921)   Min: 18 min sets/sec reps notes   Hip ER stretch 30 4    Hamstring stretch- contract relax 30 4    SL rotation stretch 10 10    Hip Ext      Hooklying hip fall out 2 10 purple   Bridge  3-5sec 20    Kneeling Alt Arm-Leg      LTR 1 10    Supine nerve gliding/hamstring 30 4 towel   Side planks       Kneeling hip abd/ext      1/2 kneeling down chop      Std band pull down      SL hip abd/clam      Lateral band pull      Lateral band walk      Bosu Lunge Slide lunge       Ham string stretch      Hip Flex stretch      Glute Stretch      SLS Ball/wall glute      Manual Intervention (58879) Min: 26 min      DN      Prone PA 1 5 L/s and SI jt   GISTM/STM      Lumbar Manip      SI Manip      Hip belt mobs 1 6    Hip LA distraction      IASTM 1 15 L SI jt/glutes and Medial and lateral hamstring tendon and belly         NMR re-education (13030)   Min:      Mf Activation- re-ed      TrA Re-ed activation      Glute Max re-ed activation      Prone ira Rodgers            Therapeutic Activity (56738) Min:                                Therapeutic Exercise and NMR EXR  [x] (41814) Provided verbal/tactile cueing for activities related to strengthening, flexibility, endurance, ROM  for improvements in proximal hip and core control with self care, mobility, lifting and ambulation. [x] (93878) Provided verbal/tactile cueing for activities related to improving balance, coordination, kinesthetic sense, posture, motor skill, proprioception  to assist with core control in self care, mobility, lifting, and ambulation.      Therapeutic Activities:    [] (25026 or 81204) Provided verbal/tactile cueing for activities related to improving balance, coordination, kinesthetic sense, posture, motor skill, proprioception and motor activation to allow for proper function  with self care and ADLs  [] (39233) Provided training and instruction to the patient for proper core and proximal hip recruitment and positioning with ambulation re-education     Home Exercise Program:    [x] (98289) Reviewed/Progressed HEP activities related to strengthening, flexibility, endurance, ROM of core, proximal hip and LE for functional self-care, mobility, lifting and ambulation   [] (52670) Reviewed/Progressed HEP activities related to improving balance, coordination, kinesthetic sense, posture, motor skill, proprioception of core, proximal hip and LE for self care, mobility, lifting, and ambulation Manual Treatments:  PROM / STM / Oscillations-Mobs:  G-I, II, III, IV (PA's, Inf., Post.)  [x] (29564) Provided manual therapy to mobilize proximal hip and LS spine soft tissue/joints for the purpose of modulating pain, promoting relaxation,  increasing ROM, reducing/eliminating soft tissue swelling/inflammation/restriction, improving soft tissue extensibility and allowing for proper ROM for normal function with self care, mobility, lifting and ambulation. Modalities:       Charges:  Timed Code Treatment Minutes: 44   Total Treatment Minutes: 44     [] EVAL (LOW) 42352 (typically 20 minutes face-to-face)  [] EVAL (MOD) 58833 (typically 30 minutes face-to-face)  [] EVAL (HIGH) 00134 (typically 45 minutes face-to-face)  [] RE-EVAL     [x] UU(09267) x  1   [] DRY NEEDLE 1 OR 2 MUSCLES  [] NMR (20241) x     [] DRY NEEDLE 3+ MUSCLES  [x] Manual (01687) x   2    [] TA (58655) x     [] Mech Traction (92706)  [] ES(attended) (44826)     [] ES (un) (93303):   [] VASO (54545)  [] Other:    If [facility] Please Indicate Time In/Out  CPT Code Time in Time out                                     GOALS:  Patient stated goal: reduce leg pain   [] Progressing: [] Met: [] Not Met: [] Adjusted  Therapist goals for Patient:   Short Term Goals: To be achieved in: 2 weeks  1. Independent in HEP and progression per patient tolerance, in order to prevent re-injury. [] Progressing: [] Met: [] Not Met: [] Adjusted  2. Patient will have a decrease in pain to facilitate improvement in movement, function, and ADLs as indicated by Functional Deficits. [] Progressing: [] Met: [] Not Met: [] Adjusted    Long Term Goals: To be achieved in: 4 weeks  1. Disability index score of 60% or less for the KB to assist with reaching prior level of function. [] Progressing: [] Met: [] Not Met: [] Adjusted  2.  Patient will demonstrate increased AROM to WNL, good LS mobility, good hip ROM to allow for proper joint functioning as indicated by patients Functional Deficits. [] Progressing: [] Met: [] Not Met: [] Adjusted  3. Patient will demonstrate an increase in Strength to good proximal hip and core activation to allow for proper functional mobility as indicated by patients Functional Deficits. [] Progressing: [] Met: [] Not Met: [] Adjusted  4. Patient will return to sitting functional activities without increased symptoms or restriction. [] Progressing: [] Met: [] Not Met: [] Adjusted  5. Patient will be able to ascend/descend stairs reciprocally without increased symptoms or restriction. (patient specific functional goal)     ASSESSMENT:  Patient continues with less restriction in distal medial hamstring, does have some continued tenderness into hamstring belly. Tolerated all IASTM. Continued with opening of L L5/S1. Most tender along SI joint of L. Did well with exercises, no reproduction on hamstring pain with hip ER today; only noted in belly of hamstring at end of reps with bridging. Provided print out for nerve glide. Treatment/Activity Tolerance:  [x] Patient tolerated treatment well [] Patient limited by fatique  [] Patient limited by pain  [] Patient limited by other medical complications  [] Other:     Overall Progression Towards Functional goals/ Treatment Progress Update:  [] Patient is progressing as expected towards functional goals listed. [] Progression is slowed due to complexities/Impairments listed. [] Progression has been slowed due to co-morbidities.   [x] Plan just implemented, too soon to assess goals progression <30days   [] Goals require adjustment due to lack of progress  [] Patient is not progressing as expected and requires additional follow up with physician  [] Other:    Prognosis for POC: [x] Good [] Fair  [] Poor    Patient requires continued skilled intervention: [x] Yes  [] No        PLAN: See eval  [] Continue per plan of care [] Alter current plan (see comments)  [x] Plan of care initiated [] Hold pending MD visit [] Discharge    Electronically signed by: Bradly Ortiz PT    Note: If patient does not return for scheduled/recommended follow up visits, this note will serve as a discharge from care along with the most recent update on progress.

## 2020-09-17 ENCOUNTER — HOSPITAL ENCOUNTER (OUTPATIENT)
Dept: PHYSICAL THERAPY | Age: 75
Setting detail: THERAPIES SERIES
Discharge: HOME | End: 2020-09-17
Payer: MEDICARE

## 2020-09-17 PROCEDURE — 97110 THERAPEUTIC EXERCISES: CPT

## 2020-09-17 PROCEDURE — 97140 MANUAL THERAPY 1/> REGIONS: CPT

## 2020-09-17 NOTE — FLOWSHEET NOTE
[address]  Phone: [phone]   Fax:     [phone]    Physical Therapy Treatment Note/ Progress Report:     Date:  2020    Patient Name:  Fabrice Lockhart    :  1945  MRN: [id number]  Restrictions/Precautions:    Medical/Treatment Diagnosis Information:  · Diagnosis: L knee pain, lumbar radiculopathy  · Treatment Diagnosis: L knee pain M25.562, LBP L65.6  Insurance/Certification information:  PT Insurance Information: Medicare/Medical [place]  Physician Information:  Referring Practitioner: Caitlin Parr of care signed (Y/N):     Date of Patient follow up with Physician:      Progress Report: [x]  Yes  []  No     Date Range for reporting period:  Beginnin2020  Ending: -    Progress report due (10 Rx/or 30 days whichever is less): 7/10/6873     Recertification due (POC duration/ or 90 days whichever is less):2020     Visit # Insurance Allowable Auth Needed   5 Medicare/Med [place] []Yes    [x]No     Pain level:  6/10   Functional Scale: LEFS 70% disability   Date Assessed: 2020    SUBJECTIVE:  Patient reports she felt sore following session yesterday. Pt notes that she hasn't had pain in back but more on posteromedial aspect of knee. Had MRI of lumbar spine earlier today.       OBJECTIVE:    Observation:    Test measurements:      RESTRICTIONS/PRECAUTIONS: none    Exercises/Interventions:     Therapeutic Ex (51504)   Min: 15 min sets/sec reps notes         SL rotation stretch 10 10    Hip Ext      Hooklying hip fall out 2 10 purple   Bridge  3-5sec 20    Kneeling Alt Arm-Leg         Supine nerve gliding/hamstring 30 4 towel   Side planks       Kneeling hip abd/ext      1/2 kneeling down chop      Std band pull down      SL hip abd/clam      Lateral band pull      Lateral band walk      Bosu Lunge      Slide lunge       Ham string stretch      Hip Flex stretch      Glute Stretch Prone glue max squeeze 10 10    Manual Intervention (44436) Min: 25 min      DN      Prone PA 1 10 bilat L/s and SI jt   GISTM/STM      Lumbar Manip      SI Manip      Hip belt mobs 1 0    Hip LA distraction      IASTM 1 15 Medial and lateral hamstring tendon and belly         NMR re-education (16737)   Min:      Mf Activation- re-ed      TrA Re-ed activation      Glute Max re-ed activation      Prone ira Rodgers            Therapeutic Activity (40265) Min:                                Therapeutic Exercise and NMR EXR  [x] (84301) Provided verbal/tactile cueing for activities related to strengthening, flexibility, endurance, ROM  for improvements in proximal hip and core control with self care, mobility, lifting and ambulation. [x] (05584) Provided verbal/tactile cueing for activities related to improving balance, coordination, kinesthetic sense, posture, motor skill, proprioception  to assist with core control in self care, mobility, lifting, and ambulation.      Therapeutic Activities:    [] (21645 or 98681) Provided verbal/tactile cueing for activities related to improving balance, coordination, kinesthetic sense, posture, motor skill, proprioception and motor activation to allow for proper function  with self care and ADLs  [] (34872) Provided training and instruction to the patient for proper core and proximal hip recruitment and positioning with ambulation re-education     Home Exercise Program:    [x] (24332) Reviewed/Progressed HEP activities related to strengthening, flexibility, endurance, ROM of core, proximal hip and LE for functional self-care, mobility, lifting and ambulation   [] (29232) Reviewed/Progressed HEP activities related to improving balance, coordination, kinesthetic sense, posture, motor skill, proprioception of core, proximal hip and LE for self care, mobility, lifting, and ambulation      Manual Treatments:  PROM / STM / Oscillations-Mobs:  G-I, II, III, IV (PA's, Inf., Post.)  [x] (22439) Provided manual therapy to mobilize proximal hip and LS spine soft tissue/joints for the purpose of modulating pain, promoting relaxation,  increasing ROM, reducing/eliminating soft tissue swelling/inflammation/restriction, improving soft tissue extensibility and allowing for proper ROM for normal function with self care, mobility, lifting and ambulation. Modalities:       Charges:  Timed Code Treatment Minutes: 40   Total Treatment Minutes: 40     [] EVAL (LOW) 14252 (typically 20 minutes face-to-face)  [] EVAL (MOD) 83369 (typically 30 minutes face-to-face)  [] EVAL (HIGH) 82726 (typically 45 minutes face-to-face)  [] RE-EVAL     [x] SIERRA(12389) x  1   [] DRY NEEDLE 1 OR 2 MUSCLES  [] NMR (46944) x     [] DRY NEEDLE 3+ MUSCLES  [x] Manual (36284) x   2    [] TA (01693) x     [] Mech Traction (44255)  [] ES(attended) (74950)     [] ES (un) (20336):   [] VASO (38244)  [] Other:    If [facility] Please Indicate Time In/Out  CPT Code Time in Time out                                     GOALS:  Patient stated goal: reduce leg pain   [] Progressing: [] Met: [] Not Met: [] Adjusted  Therapist goals for Patient:   Short Term Goals: To be achieved in: 2 weeks  1. Independent in HEP and progression per patient tolerance, in order to prevent re-injury. [] Progressing: [] Met: [] Not Met: [] Adjusted  2. Patient will have a decrease in pain to facilitate improvement in movement, function, and ADLs as indicated by Functional Deficits. [] Progressing: [] Met: [] Not Met: [] Adjusted    Long Term Goals: To be achieved in: 4 weeks  1. Disability index score of 60% or less for the KB to assist with reaching prior level of function. [] Progressing: [] Met: [] Not Met: [] Adjusted  2. Patient will demonstrate increased AROM to WNL, good LS mobility, good hip ROM to allow for proper joint functioning as indicated by patients Functional Deficits. [] Progressing: [] Met: [] Not Met: [] Adjusted  3.  Patient will demonstrate an increase in Strength to good proximal hip and core activation to allow for proper functional mobility as indicated by patients Functional Deficits. [] Progressing: [] Met: [] Not Met: [] Adjusted  4. Patient will return to sitting functional activities without increased symptoms or restriction. [] Progressing: [] Met: [] Not Met: [] Adjusted  5. Patient will be able to ascend/descend stairs reciprocally without increased symptoms or restriction. (patient specific functional goal)     ASSESSMENT:  Patient continues with less restriction in mid belly of hamstring. Mild continued restriction in  distal medial and lateral hamstring. Tolerated all IASTM with reports of improvement in discomfort. Continued with opening of L L5/S1. Most tender along SI joint of R. Did well with SI mobs with good improvement in overall mobility. Did well with exercises, modified bridge as mild cramping in L hamstring. Reporting decreased discomfort at conclusion. Continues to ambulate with increased medial-lateral sway. Treatment/Activity Tolerance:  [x] Patient tolerated treatment well [] Patient limited by fatique  [] Patient limited by pain  [] Patient limited by other medical complications  [] Other:     Overall Progression Towards Functional goals/ Treatment Progress Update:  [] Patient is progressing as expected towards functional goals listed. [] Progression is slowed due to complexities/Impairments listed. [] Progression has been slowed due to co-morbidities.   [x] Plan just implemented, too soon to assess goals progression <30days   [] Goals require adjustment due to lack of progress  [] Patient is not progressing as expected and requires additional follow up with physician  [] Other:    Prognosis for POC: [x] Good [] Fair  [] Poor    Patient requires continued skilled intervention: [x] Yes  [] No        PLAN: See eval  [] Continue per plan of care [] Alter current plan (see comments)  [x] Plan of care initiated [] Hold pending MD visit [] Discharge    Electronically signed by: Ele Garcias PT    Note: If patient does not return for scheduled/recommended follow up visits, this note will serve as a discharge from care along with the most recent update on progress.

## 2020-09-22 ENCOUNTER — HOSPITAL ENCOUNTER (OUTPATIENT)
Dept: PHYSICAL THERAPY | Age: 75
Setting detail: THERAPIES SERIES
Discharge: HOME | End: 2020-09-22
Payer: MEDICARE

## 2020-09-22 PROCEDURE — 97140 MANUAL THERAPY 1/> REGIONS: CPT

## 2020-09-22 PROCEDURE — 97110 THERAPEUTIC EXERCISES: CPT

## 2020-09-22 NOTE — FLOWSHEET NOTE
[address]  Phone: [phone]   Fax:     [phone]    Physical Therapy Treatment Note/ Progress Report:     Date:  2020    Patient Name:  Jim Fernandes    :  1945  MRN: [id number]  Restrictions/Precautions:    Medical/Treatment Diagnosis Information:  · Diagnosis: L knee pain, lumbar radiculopathy  · Treatment Diagnosis: L knee pain M25.562, LBP R03.1  Insurance/Certification information:  PT Insurance Information: Medicare/Medical [place]  Physician Information:  Referring Practitioner: Lavern Favor of care signed (Y/N):     Date of Patient follow up with Physician:      Progress Report: [x]  Yes  []  No     Date Range for reporting period:  Beginnin2020  Ending: -    Progress report due (10 Rx/or 30 days whichever is less): 5266     Recertification due (POC duration/ or 90 days whichever is less):2020     Visit # Insurance Allowable Auth Needed   6 Medicare/Med [place] []Yes    [x]No     Pain level:  6/10   Functional Scale: LEFS 70% disability   Date Assessed: 2020    SUBJECTIVE:  Patient notes that she hasn't had much pain in hamstring or medial knee. Pt notes occasional pain in posterior hip but not too significant since last session.       OBJECTIVE:    Observation: decreased hip IR L>R   Test measurements:      RESTRICTIONS/PRECAUTIONS: none    Exercises/Interventions:     Therapeutic Ex (09583)   Min: 20 min sets/sec reps notes         SL rotation stretch 10 10    Hip Ext      Hooklying hip fall out 2 10 purple   Bridge  3-5sec 20    Kneeling Alt Arm-Leg         Supine nerve gliding/hamstring 30 4 towel   Side planks       Kneeling hip abd/ext      1/2 kneeling down chop      Std band pull down      SL hip abd/clam      Lateral band pull      Lateral band walk      Bosu Lunge      Standing hip abduction 2 10 2#   Standing HS curl 2 10 2#   Hip Flex stretch Supine marches 2 10    Prone glue max squeeze 10 10    Manual Intervention (14415) Min: 25 min      DN      Prone PA 1 10 bilat L/s and SI jt   GISTM/STM      Lumbar Manip      SI Manip      Hip belt mobs 1 0    Hip LA distraction      IASTM 1 15 Medial and lateral hamstring tendon and belly         NMR re-education (13413)   Min:      Mf Activation- re-ed      TrA Re-ed activation      Glute Max re-ed activation      Prone ira Rodgers            Therapeutic Activity (18947) Min:                                Therapeutic Exercise and NMR EXR  [x] (30940) Provided verbal/tactile cueing for activities related to strengthening, flexibility, endurance, ROM  for improvements in proximal hip and core control with self care, mobility, lifting and ambulation. [x] (85511) Provided verbal/tactile cueing for activities related to improving balance, coordination, kinesthetic sense, posture, motor skill, proprioception  to assist with core control in self care, mobility, lifting, and ambulation.      Therapeutic Activities:    [] (58310 or 99286) Provided verbal/tactile cueing for activities related to improving balance, coordination, kinesthetic sense, posture, motor skill, proprioception and motor activation to allow for proper function  with self care and ADLs  [] (96100) Provided training and instruction to the patient for proper core and proximal hip recruitment and positioning with ambulation re-education     Home Exercise Program:    [x] (26532) Reviewed/Progressed HEP activities related to strengthening, flexibility, endurance, ROM of core, proximal hip and LE for functional self-care, mobility, lifting and ambulation   [] (41532) Reviewed/Progressed HEP activities related to improving balance, coordination, kinesthetic sense, posture, motor skill, proprioception of core, proximal hip and LE for self care, mobility, lifting, and ambulation      Manual Treatments:  PROM / STM / Oscillations-Mobs:  G-I, II, III, IV (Nicole, Inf., Post.)  [x] (99830) Provided manual therapy to mobilize proximal hip and LS spine soft tissue/joints for the purpose of modulating pain, promoting relaxation,  increasing ROM, reducing/eliminating soft tissue swelling/inflammation/restriction, improving soft tissue extensibility and allowing for proper ROM for normal function with self care, mobility, lifting and ambulation. Modalities:       Charges:  Timed Code Treatment Minutes: 45   Total Treatment Minutes: 45     [] EVAL (LOW) 64649 (typically 20 minutes face-to-face)  [] EVAL (MOD) 25748 (typically 30 minutes face-to-face)  [] EVAL (HIGH) 44485 (typically 45 minutes face-to-face)  [] RE-EVAL     [x] EI(98794) x  1   [] DRY NEEDLE 1 OR 2 MUSCLES  [] NMR (21287) x     [] DRY NEEDLE 3+ MUSCLES  [x] Manual (48392) x   2    [] TA (49088) x     [] Mech Traction (25570)  [] ES(attended) (27861)     [] ES (un) (74512):   [] VASO (07357)  [] Other:    If [facility] Please Indicate Time In/Out  CPT Code Time in Time out                                     GOALS:  Patient stated goal: reduce leg pain   [] Progressing: [] Met: [] Not Met: [] Adjusted  Therapist goals for Patient:   Short Term Goals: To be achieved in: 2 weeks  1. Independent in HEP and progression per patient tolerance, in order to prevent re-injury. [] Progressing: [] Met: [] Not Met: [] Adjusted  2. Patient will have a decrease in pain to facilitate improvement in movement, function, and ADLs as indicated by Functional Deficits. [] Progressing: [] Met: [] Not Met: [] Adjusted    Long Term Goals: To be achieved in: 4 weeks  1. Disability index score of 60% or less for the KB to assist with reaching prior level of function. [] Progressing: [] Met: [] Not Met: [] Adjusted  2. Patient will demonstrate increased AROM to WNL, good LS mobility, good hip ROM to allow for proper joint functioning as indicated by patients Functional Deficits.    [] Progressing: [] Met: [] Not Met: [] Adjusted  3. Patient will demonstrate an increase in Strength to good proximal hip and core activation to allow for proper functional mobility as indicated by patients Functional Deficits. [] Progressing: [] Met: [] Not Met: [] Adjusted  4. Patient will return to sitting functional activities without increased symptoms or restriction. [] Progressing: [] Met: [] Not Met: [] Adjusted  5. Patient will be able to ascend/descend stairs reciprocally without increased symptoms or restriction. (patient specific functional goal)     ASSESSMENT: Pt tolerated session well. Pt continues to show decreased hip ROM but improved hamstring pain. Pt able to tolerate additional hamstring strengthening exercise with no pain. Continue to progress strengthening exercises to improve discomfort during daily activities. Treatment/Activity Tolerance:  [x] Patient tolerated treatment well [] Patient limited by fatique  [] Patient limited by pain  [] Patient limited by other medical complications  [] Other:     Overall Progression Towards Functional goals/ Treatment Progress Update:  [] Patient is progressing as expected towards functional goals listed. [] Progression is slowed due to complexities/Impairments listed. [] Progression has been slowed due to co-morbidities.   [x] Plan just implemented, too soon to assess goals progression <30days   [] Goals require adjustment due to lack of progress  [] Patient is not progressing as expected and requires additional follow up with physician  [] Other:    Prognosis for POC: [x] Good [] Fair  [] Poor    Patient requires continued skilled intervention: [x] Yes  [] No        PLAN: See eval  [] Continue per plan of care [] Alter current plan (see comments)  [x] Plan of care initiated [] Hold pending MD visit [] Discharge    Electronically signed by: Darya Henry [facility]  Therapist was present, directed the patient's care, made skilled judgement, and was responsible for assessment and treatment of the patient      Note: If patient does not return for scheduled/recommended follow up visits, this note will serve as a discharge from care along with the most recent update on progress.

## 2020-09-24 ENCOUNTER — HOSPITAL ENCOUNTER (OUTPATIENT)
Dept: PHYSICAL THERAPY | Age: 75
Setting detail: THERAPIES SERIES
Discharge: HOME | End: 2020-09-24
Payer: MEDICARE

## 2020-09-24 PROCEDURE — 97140 MANUAL THERAPY 1/> REGIONS: CPT

## 2020-09-24 PROCEDURE — 97110 THERAPEUTIC EXERCISES: CPT

## 2020-09-24 NOTE — FLOWSHEET NOTE
11 bilat L/s and SI jt   GISTM/STM      Lumbar Manip      SI Manip      LAD 1 4 bilat   Hip LA distraction      IASTM 1 15 Medial and lateral hamstring tendon and belly, Bilateral GT         NMR re-education (19430)   Min:      Mf Activation- re-ed      TrA Re-ed activation      Glute Max re-ed activation      Prone ira Rodgers            Therapeutic Activity (90330) Min:                                Therapeutic Exercise and NMR EXR  [x] (04311) Provided verbal/tactile cueing for activities related to strengthening, flexibility, endurance, ROM  for improvements in proximal hip and core control with self care, mobility, lifting and ambulation. [x] (01698) Provided verbal/tactile cueing for activities related to improving balance, coordination, kinesthetic sense, posture, motor skill, proprioception  to assist with core control in self care, mobility, lifting, and ambulation.      Therapeutic Activities:    [] (99340 or 06327) Provided verbal/tactile cueing for activities related to improving balance, coordination, kinesthetic sense, posture, motor skill, proprioception and motor activation to allow for proper function  with self care and ADLs  [] (59426) Provided training and instruction to the patient for proper core and proximal hip recruitment and positioning with ambulation re-education     Home Exercise Program:    [x] (47281) Reviewed/Progressed HEP activities related to strengthening, flexibility, endurance, ROM of core, proximal hip and LE for functional self-care, mobility, lifting and ambulation   [] (33712) Reviewed/Progressed HEP activities related to improving balance, coordination, kinesthetic sense, posture, motor skill, proprioception of core, proximal hip and LE for self care, mobility, lifting, and ambulation      Manual Treatments:  PROM / STM / Oscillations-Mobs:  G-I, II, III, IV (PA's, Inf., Post.)  [x] (73093) Provided manual therapy to mobilize proximal hip and LS spine soft tissue/joints for the purpose of modulating pain, promoting relaxation,  increasing ROM, reducing/eliminating soft tissue swelling/inflammation/restriction, improving soft tissue extensibility and allowing for proper ROM for normal function with self care, mobility, lifting and ambulation. Modalities:       Charges:  Timed Code Treatment Minutes: 45   Total Treatment Minutes: 45     [] EVAL (LOW) 67642 (typically 20 minutes face-to-face)  [] EVAL (MOD) 50885 (typically 30 minutes face-to-face)  [] EVAL (HIGH) 90737 (typically 45 minutes face-to-face)  [] RE-EVAL     [x] DY(13719) x  1   [] DRY NEEDLE 1 OR 2 MUSCLES  [] NMR (72769) x     [] DRY NEEDLE 3+ MUSCLES  [x] Manual (06001) x   2    [] TA (58983) x     [] Mech Traction (88873)  [] ES(attended) (45726)     [] ES (un) (56610):   [] VASO (02393)  [] Other:    If [facility] Please Indicate Time In/Out  CPT Code Time in Time out                                     GOALS:  Patient stated goal: reduce leg pain   [] Progressing: [] Met: [] Not Met: [] Adjusted  Therapist goals for Patient:   Short Term Goals: To be achieved in: 2 weeks  1. Independent in HEP and progression per patient tolerance, in order to prevent re-injury. [] Progressing: [] Met: [] Not Met: [] Adjusted  2. Patient will have a decrease in pain to facilitate improvement in movement, function, and ADLs as indicated by Functional Deficits. [] Progressing: [] Met: [] Not Met: [] Adjusted    Long Term Goals: To be achieved in: 4 weeks  1. Disability index score of 60% or less for the KB to assist with reaching prior level of function. [] Progressing: [] Met: [] Not Met: [] Adjusted  2. Patient will demonstrate increased AROM to WNL, good LS mobility, good hip ROM to allow for proper joint functioning as indicated by patients Functional Deficits. [] Progressing: [] Met: [] Not Met: [] Adjusted  3.  Patient will demonstrate an increase in Strength to good proximal hip and core activation to allow for proper functional mobility as indicated by patients Functional Deficits. [] Progressing: [] Met: [] Not Met: [] Adjusted  4. Patient will return to sitting functional activities without increased symptoms or restriction. [] Progressing: [] Met: [] Not Met: [] Adjusted  5. Patient will be able to ascend/descend stairs reciprocally without increased symptoms or restriction. (patient specific functional goal)     ASSESSMENT: Pt tolerated session well. Pt felt pain better at end of session. . Pt able to tolerate additional hamstring strengthening exercise with no pain. Continue to progress strengthening exercises to improve discomfort during daily activities. Treatment/Activity Tolerance:  [x] Patient tolerated treatment well [] Patient limited by fatique  [] Patient limited by pain  [] Patient limited by other medical complications  [] Other:     Overall Progression Towards Functional goals/ Treatment Progress Update:  [] Patient is progressing as expected towards functional goals listed. [] Progression is slowed due to complexities/Impairments listed. [] Progression has been slowed due to co-morbidities.   [x] Plan just implemented, too soon to assess goals progression <30days   [] Goals require adjustment due to lack of progress  [] Patient is not progressing as expected and requires additional follow up with physician  [] Other:    Prognosis for POC: [x] Good [] Fair  [] Poor    Patient requires continued skilled intervention: [x] Yes  [] No        PLAN: See eval  [] Continue per plan of care [] Alter current plan (see comments)  [x] Plan of care initiated [] Hold pending MD visit [] Discharge    Electronically signed by: Jose Barton, SPT  Therapist was present, directed the patient's care, made skilled judgement, and was responsible for assessment and treatment of the patient      Note: If patient does not return for scheduled/recommended follow up visits, this note will serve as a discharge from care along with the most recent update on progress.

## 2020-09-28 ENCOUNTER — HOSPITAL ENCOUNTER (OUTPATIENT)
Dept: PHYSICAL THERAPY | Age: 75
Setting detail: THERAPIES SERIES
Discharge: HOME | End: 2020-09-28
Payer: MEDICARE

## 2020-09-28 PROCEDURE — 97110 THERAPEUTIC EXERCISES: CPT

## 2020-09-28 PROCEDURE — 97140 MANUAL THERAPY 1/> REGIONS: CPT

## 2020-09-28 NOTE — FLOWSHEET NOTE
[address]  Phone: [phone]   Fax:     [phone]    Physical Therapy Treatment Note/ Progress Report:     Date:  2020    Patient Name:  Daksha Cunha    :  1945  MRN: [id number]  Restrictions/Precautions:    Medical/Treatment Diagnosis Information:  · Diagnosis: L knee pain, lumbar radiculopathy  · Treatment Diagnosis: L knee pain M25.562, LBP M90.3  Insurance/Certification information:  PT Insurance Information: Medicare/Medical [place]  Physician Information:  Referring Practitioner: Sherrie Hsieh of care signed (Y/N):     Date of Patient follow up with Physician:      Progress Report: [x]  Yes  []  No     Date Range for reporting period:  Beginnin2020  Ending: -    Progress report due (10 Rx/or 30 days whichever is less):      Recertification due (POC duration/ or 90 days whichever is less):2020     Visit # Insurance Allowable Auth Needed   8 Medicare/Med [place] []Yes    [x]No     Pain level:  3/10   Functional Scale: LEFS 70% disability   Date Assessed: 2020    SUBJECTIVE:  Patient notes that she is having less discomfort in back of thigh- still there but more mild. Reports feeling more in outside of hip and into glutes.       OBJECTIVE:    Observation: decreased hip IR L>R   Test measurements:      RESTRICTIONS/PRECAUTIONS: none    Exercises/Interventions:     Therapeutic Ex (15327)   Min: 15 min sets/sec reps notes         SL rotation stretch 10 10    Hip Ext 2 10 2#   Bridge  3-5sec 20    Kneeling Alt Arm-Leg         Supine nerve gliding/hamstring Side planks       Kneeling hip abd/ext      1/2 kneeling down chop      Std band pull down      SL hip abd/clam 1 15 No band   Lateral band pull      Lateral band walk      Bosu Lunge      Standing hip abduction 2 10 2#   Standing HS curl 2 10 2#   Hip Flex stretch            Manual Intervention (60044) Min: 25 min DN      Prone PA 1 8 bilat L/s and SI jt   GISTM/STM      Lumbar Manip      L hip mobs with belt and prone ER mobs 1 5    LAD 1 0 bilat   Hip LA distraction      IASTM 1 12 Medial and lateral hamstring tendon and belly, L greater trochanter         NMR re-education (52467)   Min:      Mf Activation- re-ed      TrA Re-ed activation      Glute Max re-ed activation      Prone ira Rodgers            Therapeutic Activity (87726) Min:                                Therapeutic Exercise and NMR EXR  [x] (84661) Provided verbal/tactile cueing for activities related to strengthening, flexibility, endurance, ROM  for improvements in proximal hip and core control with self care, mobility, lifting and ambulation. [x] (89804) Provided verbal/tactile cueing for activities related to improving balance, coordination, kinesthetic sense, posture, motor skill, proprioception  to assist with core control in self care, mobility, lifting, and ambulation.      Therapeutic Activities:    [] (86496 or 71282) Provided verbal/tactile cueing for activities related to improving balance, coordination, kinesthetic sense, posture, motor skill, proprioception and motor activation to allow for proper function  with self care and ADLs  [] (45241) Provided training and instruction to the patient for proper core and proximal hip recruitment and positioning with ambulation re-education     Home Exercise Program:    [x] (50701) Reviewed/Progressed HEP activities related to strengthening, flexibility, endurance, ROM of core, proximal hip and LE for functional self-care, mobility, lifting and ambulation   [] (91143) Reviewed/Progressed HEP activities related to improving balance, coordination, kinesthetic sense, posture, motor skill, proprioception of core, proximal hip and LE for self care, mobility, lifting, and ambulation      Manual Treatments:  PROM / STM / Oscillations-Mobs:  G-I, II, III, IV (PA's, Inf., Post.)  [x] (22592) Provided manual therapy to mobilize proximal hip and LS spine soft tissue/joints for the purpose of modulating pain, promoting relaxation,  increasing ROM, reducing/eliminating soft tissue swelling/inflammation/restriction, improving soft tissue extensibility and allowing for proper ROM for normal function with self care, mobility, lifting and ambulation. Modalities:       Charges:  Timed Code Treatment Minutes: 40   Total Treatment Minutes: 42     [] EVAL (LOW) 73010 (typically 20 minutes face-to-face)  [] EVAL (MOD) 15882 (typically 30 minutes face-to-face)  [] EVAL (HIGH) 64030 (typically 45 minutes face-to-face)  [] RE-EVAL     [x] AK(01606) x  1   [] DRY NEEDLE 1 OR 2 MUSCLES  [] NMR (73289) x     [] DRY NEEDLE 3+ MUSCLES  [x] Manual (93005) x   2    [] TA (88863) x     [] Mech Traction (57253)  [] ES(attended) (76521)     [] ES (un) (92328):   [] VASO (95126)  [] Other:    If [facility] Please Indicate Time In/Out  CPT Code Time in Time out                                     GOALS:  Patient stated goal: reduce leg pain   [] Progressing: [] Met: [] Not Met: [] Adjusted  Therapist goals for Patient:   Short Term Goals: To be achieved in: 2 weeks  1. Independent in HEP and progression per patient tolerance, in order to prevent re-injury. [] Progressing: [] Met: [] Not Met: [] Adjusted  2. Patient will have a decrease in pain to facilitate improvement in movement, function, and ADLs as indicated by Functional Deficits. [] Progressing: [] Met: [] Not Met: [] Adjusted    Long Term Goals: To be achieved in: 4 weeks  1. Disability index score of 60% or less for the KB to assist with reaching prior level of function. [] Progressing: [] Met: [] Not Met: [] Adjusted  2. Patient will demonstrate increased AROM to WNL, good LS mobility, good hip ROM to allow for proper joint functioning as indicated by patients Functional Deficits. [] Progressing: [] Met: [] Not Met: [] Adjusted  3.  Patient will demonstrate an increase in Strength to good proximal hip and core activation to allow for proper functional mobility as indicated by patients Functional Deficits. [] Progressing: [] Met: [] Not Met: [] Adjusted  4. Patient will return to sitting functional activities without increased symptoms or restriction. [] Progressing: [] Met: [] Not Met: [] Adjusted  5. Patient will be able to ascend/descend stairs reciprocally without increased symptoms or restriction. (patient specific functional goal)     ASSESSMENT: Pt tolerated session well. Patient with mild limitation in hip IR/ER today- improved with mobilizations. Added further IASTM to glute max/med and greater trochanter with patient reporting improvement in discomfort in this area. Further progress proximal hip strengthening. Patient reporting improvement in discomfort at conclusion. Treatment/Activity Tolerance:  [x] Patient tolerated treatment well [] Patient limited by fatique  [] Patient limited by pain  [] Patient limited by other medical complications  [] Other:     Overall Progression Towards Functional goals/ Treatment Progress Update:  [] Patient is progressing as expected towards functional goals listed. [] Progression is slowed due to complexities/Impairments listed. [] Progression has been slowed due to co-morbidities.   [x] Plan just implemented, too soon to assess goals progression <30days   [] Goals require adjustment due to lack of progress  [] Patient is not progressing as expected and requires additional follow up with physician  [] Other:    Prognosis for POC: [x] Good [] Fair  [] Poor    Patient requires continued skilled intervention: [x] Yes  [] No        PLAN: See eval  [] Continue per plan of care [] Alter current plan (see comments)  [x] Plan of care initiated [] Hold pending MD visit [] Discharge    Electronically signed by: Nandini Lea, PT  Therapist was present, directed the patient's care, made skilled judgement, and was responsible for assessment and treatment of the patient      Note: If patient does not return for scheduled/recommended follow up visits, this note will serve as a discharge from care along with the most recent update on progress.

## 2020-09-30 ENCOUNTER — OFFICE VISIT (OUTPATIENT)
Dept: ORTHOPEDIC SURGERY | Age: 75
End: 2020-09-30
Payer: MEDICARE

## 2020-09-30 VITALS — HEIGHT: 62 IN | BODY MASS INDEX: 40.85 KG/M2 | WEIGHT: 222 LBS

## 2020-09-30 PROCEDURE — 1036F TOBACCO NON-USER: CPT | Performed by: ORTHOPAEDIC SURGERY

## 2020-09-30 PROCEDURE — 99214 OFFICE O/P EST MOD 30 MIN: CPT | Performed by: ORTHOPAEDIC SURGERY

## 2020-09-30 PROCEDURE — 3017F COLORECTAL CA SCREEN DOC REV: CPT | Performed by: ORTHOPAEDIC SURGERY

## 2020-09-30 PROCEDURE — G8400 PT W/DXA NO RESULTS DOC: HCPCS | Performed by: ORTHOPAEDIC SURGERY

## 2020-09-30 PROCEDURE — 4040F PNEUMOC VAC/ADMIN/RCVD: CPT | Performed by: ORTHOPAEDIC SURGERY

## 2020-09-30 PROCEDURE — G8427 DOCREV CUR MEDS BY ELIG CLIN: HCPCS | Performed by: ORTHOPAEDIC SURGERY

## 2020-09-30 PROCEDURE — G8417 CALC BMI ABV UP PARAM F/U: HCPCS | Performed by: ORTHOPAEDIC SURGERY

## 2020-09-30 PROCEDURE — 1090F PRES/ABSN URINE INCON ASSESS: CPT | Performed by: ORTHOPAEDIC SURGERY

## 2020-09-30 PROCEDURE — 1123F ACP DISCUSS/DSCN MKR DOCD: CPT | Performed by: ORTHOPAEDIC SURGERY

## 2020-09-30 NOTE — PROGRESS NOTES
9/30/20  History of Present Illness:  Ruben Irene is a 76 y.o. female recheck evaluation lumbar spine    Chief complaint that brought the patient in the office today: Lumbar spine and left leg pain      Location left leg pain  Severity moderate  Duration is coming gone for several months  Associated sign/symptoms pain in the inner thigh pain in the hamstring area up into the gluteal area of the left lower extremity    I have reviewed and discussed the below Pain assessment findings with the patient. Medical History  Patient's medications, allergies, past medical, surgical, social and family histories were reviewed and updated as appropriate.     Past Medical History:   Diagnosis Date    Arthritis     Asthma     Diabetes mellitus ([facility] [address]     Hyperlipidemia     Hypertension     Reflux     Sleep apnea     cpap    Thyroid disease      Family History   Problem Relation Age of Onset    Hypertension Mother     Cancer Father      Social History     Socioeconomic History    Marital status:      Spouse name: None    Number of children: None    Years of education: None    Highest education level: None   Occupational History    None   Social Needs    Financial resource strain: None    Food insecurity     Worry: None     Inability: None    Transportation needs     Medical: None     Non-medical: None   Tobacco Use    Smoking status: Never Smoker    Smokeless tobacco: Never Used   Substance and Sexual Activity    Alcohol use: Yes     Comment: social    Drug use: No    Sexual activity: None   Lifestyle    Physical activity     Days per week: None     Minutes per session: None    Stress: None   Relationships    Social connections     Talks on phone: None     Gets together: None     Attends Yarsanism service: None     Active member of club or organization: None     Attends meetings of clubs or organizations: None     Relationship status: None    Intimate partner violence     Fear of current or ex partner: None     Emotionally abused: None     Physically abused: None     Forced sexual activity: None   Other Topics Concern    None   Social History Narrative    None     Current Outpatient Medications   Medication Sig Dispense Refill    predniSONE (DELTASONE) 10 MG tablet Days1-2:50mg PO QD,Days3-4:40mg PO QD,Days5-6:30mg PO QD,Days7-8:20mg PO QD,Days 9-10:10mg PO QD 30 tablet 0    aspirin (ASPIR-81) 81 MG EC tablet Aspir-81   take one at night      GEMFIBROZIL PO Take by mouth      loratadine (CLARITIN) 10 MG tablet Claritin 10 mg tablet   Take 1 tablet every day by oral route in the morning.  guaiFENesin (MUCINEX MAXIMUM STRENGTH) 1200 MG TB12 Mucinex 1,200 mg tablet, extended release   Take 1 tablet twice a day by oral route.  pravastatin (PRAVACHOL) 20 MG tablet pravastatin 20 mg tablet   Take 1 tablet every day by oral route.  Probiotic Product (PROBIOTIC-10 PO) Probiotic   1 x day      NUTRITIONAL SUPPLEMENTS PO Take by mouth      MULTIPLE VITAMINS-MINERALS ER PO Take by mouth      Magnesium 100 MG CAPS magnesium   500mg one daily      Multiple Minerals-Vitamins (CITRACAL PLUS BONE DENSITY PO) Citracal + Bone Density   2 scoops daily      Coenzyme Q10 10 MG CAPS Take 1 capsule by mouth      Cranberry-Vitamin C-Probiotic (AZO CRANBERRY) 250-30 MG TABS Azo Cranberry + Probiotic      B COMPLEX VITAMINS ER PO Take 2 tablets by mouth      Omega-3 Fatty Acids (FISH OIL PO) Take by mouth      Omega-3 Fatty Acids (FISH OIL) 1000 MG CAPS Fish Oil   500mg  3 x day      atenolol (TENORMIN) 50 MG tablet Take 50 mg by mouth daily.  cyclobenzaprine (FLEXERIL) 10 MG tablet Take 10 mg by mouth.  insulin lispro (HUMALOG) 100 UNIT/ML pen Inject  into the skin.  insulin NPH (HUMULIN N) 100 UNIT/ML injection vial Inject  into the skin.  levothyroxine (SYNTHROID) 100 MCG tablet Take 100 mcg by mouth.       lisinopril (PRINIVIL;ZESTRIL) 20 MG tablet Take 20 mg by mouth.      metFORMIN (GLUCOPHAGE) 500 MG tablet Take 500 mg by mouth.  nateglinide (STARLIX) 120 MG tablet Take 120 mg by mouth.  omeprazole (PRILOSEC) 40 MG capsule Take 40 mg by mouth.  triamterene-hydrochlorothiazide (DYAZIDE) 37.5-25 MG per capsule Take 1 capsule by mouth.  zolpidem (AMBIEN) 10 MG tablet Take 15 mg by mouth. No current facility-administered medications for this visit. Allergies   Allergen Reactions    Atorvastatin Other (See Comments)    Adhesive Tape Rash    Codeine Nausea And Vomiting and Nausea Only    Sulfa Antibiotics Other (See Comments) and Rash       REVIEW OF SYSTEMS:   No rashes today  No new numbness  No tingling  No fevers  No depression  No new onset of pain      Pertinent items are noted in HPI  Review of systems reviewed from Patient History Form dated on 9/30/2020 and available in the patient's chart under the Media tab. Examination:    Vitals: Height 5' 2\" (1.575 m), weight 222 lb (100.7 kg), not currently breastfeeding. BMI Readings from Last 3 Encounters:   09/30/20 40.60 kg/m²   09/02/20 40.60 kg/m²   07/22/20 40.60 kg/m²       LUMBAR/SACRAL EXAMINATION:  · Inspection: Local inspection shows no step-off or bruising. Lumbar alignment is normal. No instability is noted. · Constitutional: Patient is adequately groomed with no evidence of malnutrition  · Lymphatic: The lymphatic examination bilaterally reveals all areas to be without enlargement or induration. · Mental Status: The patient is oriented to time, place and person. The patient's mood and   affect are appropriate. · Vascular: Examination reveals no swelling or calf tenderness. Peripheral pulses are palpable and 2+. · Palpation:   No evidence of tenderness at the midline. Lumbar paraspinal tenderness Mild L4/5 and L5/S1 tenderness  Bursal tenderness Left greater trochanteric tenderness  There is no paraspinal spasm.   · Range of Motion: limited by 25% in all planes due to pain  · Strength:   Strength testing is 5/5 in all muscle groups tested. · Special Tests:   Straight leg raise and crossed SLR negative. Reese's testing is negative bilaterally. FADIR's testing is negative bilaterally. Slump test negative. Bowstring test negative  · Skin: There are no rashes, ulcerations or lesions. · Reflexes: Reflexes are symmetrically 2+ at the patellar and ankle tendons. Clonus absent bilaterally at the feet. · Gait & station: antalgic on the left and no ataxia    · Additional Examinations:    · RIGHT LOWER EXTREMITY: Inspection/examination of the right lower extremity does not show any tenderness, deformity or injury. Range of motion is unremarkable. There is no gross instability. There are no rashes, ulcerations or lesions. Strength and tone are normal. No atrophy or abnormal movements are noted. · LEFT LOWER EXTREMITY:  Inspection/examination of the left lower extremity does not show any tenderness, deformity or injury. Range of motion is unremarkable. There is no gross instability. There are no rashes, ulcerations or lesions. Strength and tone are normal. No atrophy or abnormal movements are noted. Associated signs and symptoms:   Neurogenic bowel or bladder symptoms:  no   Perceived weakness:  yes   Difficulty walking:  yes    Additional Comments:  In looking at the her lower extremity today this is definitely coming from her back she has pain with a straight leg raise her EHL is 5/5 sensation is not decreased but she has hamstring pain and inner thigh pain and some gluteal pain        Diagnostic Testing:    Views MRI multiple views and I independently reviewed these films today in my office, Body Part lumbar spine impression left-sided S1 disc bulge with nerve root impingement L3-L4 moderate stenosis    Impression L3-L4 moderate stenosis, left-sided S1 disc with nerve root impingement  MRI: As stated above  Labs: none at this time    Mri Lumbar Spine Wo Contrast    Result Date: 2020  [facility]: [id number] #: [id number] Procedure: MR Lumbar Spine w/o Contrast ; Reason for Exam: lumbar radiculopathy This document is confidential medical information. Unauthorized disclosure or use of this information is prohibited by law. If you are not the intended recipient of this document, please advise us by calling immediately [phone]. [employer] [facility] [address] [address] Patient Name: James Mcburney Case ID: [id number] Patient : 1945 Referring Physician: Merlyn Benton MD Exam Date: 2020 Exam Description: MR Lumbar Spine w/o Contrast HISTORY:  Left thigh and leg pain for 6 weeks. TECHNICAL FACTORS:  Long- and short-axis fat- and water-weighted images were performed. COMPARISON:  None. FINDINGS:  Assume 5 lumbar vertebrae. Slight lateral curve convex left of lumbar spine. Lordosis is moderate. Conus medullaris termination is normal.  Low thoracic and lumbar discs are dehydrated. Multilevel mild anterior mixed spondylotic disc displacements. L1-2: Very shallow disc bulge asymmetric to left effaces left ventral dural sac; contacts left L2 nerve root. Scant facet fluid. L2-3: Disc height reduction; slight vacuum phenomenon; central and left lateral disc protrusion  gently deforms ventral and left ventral dural sac. Moderate facet hypertrophy; facet fluid. Mild spinal stenosis. L3-4: Shallow disc bulge. Moderate facet hypertrophy. Moderate spinal stenosis. L4-5: Shallow disc bulge asymmetric to left; contacts left L5 nerve root. Mild left lateral recess narrowing. Mild facet hypertrophy; low-grade facet fluid. Mild spinal stenosis. L5-S1: 2 mm retrolisthesis; vacuum phenomenon; disc bulge is near to S1 nerve roots; contacts foraminal L5 nerve roots. Moderate facet hypertrophy. Mild facet fluid. Mild spinal stenosis. Moderate bilateral lateral recess stenosis.  Abdominal viscera are partially characterized. Question arises regarding mildly enlarged spleen. No lymphadenopathy at retroperitoneum as included per imaging. CONCLUSION: 1. L5-S1 trace retrolisthesis; disc bulge asymmetric to left near to left S1 nerve root greater  than right S1 nerve root; contacts foraminal L5 nerve roots. Mild spinal stenosis. Moderate bilateral lateral recess stenosis. 2. L4-5 shallow disc bulge asymmetric to left contacts left L5 nerve root. Mild left lateral recess narrowing. Mild spinal stenosis. 3. L3-4 moderate spinal stenosis. Disc bulge. Hypertrophic posterior elements. 4. L2-3 shallow central and left lateral disc protrusion gently deforming left ventral dural sac. Mild spinal stenosis. Thank you for the opportunity to provide your interpretation. Anil Davis MD A: DOMINIC/ct 09/19/2020 10:56 PM T: CT 09/19/2020 6:43 PM         Past Surgical History:   Procedure Laterality Date    CHOLECYSTECTOMY      COLONOSCOPY      SHOULDER ARTHROSCOPY Right 4/21/15    SUBACROMIAL DECOMPRESSION, DISTAL CLAVICLE EXCISION, ROTATOR CUFF REPAIR, BICEPS TENODESIS    TONSILLECTOMY      UMBILICAL HERNIA REPAIR     . Office Procedures:  No orders of the defined types were placed in this encounter. Previous Treatments: Ice, physical therapy, MRI, X-ray, anti-inflammatories,    Differential Diagnoses: Disc herniation, hamstring injury, infection, contusion, hip pathology, Muscle injury, bone tumor, femoral acetabular osteoarthritis or stress fracture. Diagnosis lumbar radiculopathy left leg      Plan: (Medical Decision Making)    Plan (Medical Decision Making):    I discussed the diagnosis and the treatment options with Scarlett Moe today. In Summary:  The various treatment options were outlined and discussed with Scarlett Moe including:  Conservative care options: physical therapy, ice, medications, bracing, and activity modification. The indications for therapeutic injections. The indications for additional imaging/laboratory studies. The indications for (possible future) interventions. After considering the various options discussed, Marie Hua elected to pursue a course of treatment that includes the followin. Medications: No further recommendations for new medications. 2. PT:  Encouraged to continue with Home exercise program.    3. Further studies: No further studies. 4. Interventional: Follow-up with Dr. Ashwini Howell for evaluation and treatment for an epidural injection in the future    5. Healthy Lifestyle Measures:  Patient education material reviewing the following was distributed to [address] [place] [address].  Anatomic drawings  Healthy lifestyle education  Osteoporosis prevention  Back and neck pain educational information   Advanced imaging preparedness    Posture education   Proper lifting and carrying techniques,   Weight management discussed  Minor ways to treat back pain  For further information regarding the spine conditions and to review interventional treatments the patient was directed to [employer].    6.  Follow up: Follow-up with Dr. Ashwini Howell in the future for an epidural injection    Marie Hua was instructed to call the office if her symptoms worsen or if new symptoms appear prior to the next scheduled visit. She is specifically instructed to contact the office between now & her scheduled appointment if she has concerns related to her condition or if she needs assistance in scheduling the above tests. She is   welcome to call for an appointment sooner if she has any additional concerns or questions. Sam Romero D.O. [address] [place] [address] and Sports Medicine  Sports Fellowship Trained  Board Certified  [employer] Team Physician      Disclaimer: \"This note was dictated with voice recognition software. Though review and correction are routine, we apologize for any errors. \"

## 2020-10-02 ENCOUNTER — HOSPITAL ENCOUNTER (OUTPATIENT)
Dept: PHYSICAL THERAPY | Age: 75
Setting detail: THERAPIES SERIES
Discharge: HOME | End: 2020-10-02
Payer: MEDICARE

## 2020-10-02 PROCEDURE — 97140 MANUAL THERAPY 1/> REGIONS: CPT

## 2020-10-02 PROCEDURE — 97110 THERAPEUTIC EXERCISES: CPT

## 2020-10-02 NOTE — FLOWSHEET NOTE
[address]  Phone: [phone]   Fax:     [phone]    Physical Therapy Treatment Note/ Progress Report:     Date:  10/2/2020    Patient Name:  Yvan Conley    :  1945  MRN: [id number]  Restrictions/Precautions:    Medical/Treatment Diagnosis Information:  · Diagnosis: L knee pain, lumbar radiculopathy  · Treatment Diagnosis: L knee pain M25.562, LBP N53.0  Insurance/Certification information:  PT Insurance Information: Medicare/Medical [place]  Physician Information:  Referring Practitioner: Kristel Patterson of care signed (Y/N):     Date of Patient follow up with Physician:      Progress Report: [x]  Yes  []  No     Date Range for reporting period:  Beginnin2020  Ending: -    Progress report due (10 Rx/or 30 days whichever is less): 3/81/8148     Recertification due (POC duration/ or 90 days whichever is less):2020     Visit # Insurance Allowable Auth Needed   9 Medicare/Med [place] []Yes    [x]No     Pain level:  2.5/10   Functional Scale: LEFS 70% disability   Date Assessed: 2020    SUBJECTIVE:  Patient notes that she is having less discomfort in back of thigh- still there but more mild. Reports feeling more in outside of hip and into glutes. Patient notes that she had f/u with MD to review MRI- wants her to have epidural injection in back.        OBJECTIVE:    Observation: decreased hip IR L>R   Test measurements:      RESTRICTIONS/PRECAUTIONS: none    Exercises/Interventions:     Therapeutic Ex (08174)   Min: 13 min sets/sec reps notes      Hamstring and hip adductor stretch- contract relax 30 4 L hip   LTR 2 10    SL rotation stretch 10 10    Hip Ext 2 10 2#   Bridge  3-5sec 20    Kneeling Alt Arm-Leg         Supine nerve gliding/hamstring Side planks       Kneeling hip abd/ext      1/2 kneeling down chop      Std band pull down      Lateral band pull      Lateral band walk Arsalankana Stringer      Hip Flex stretch            Manual Intervention (51413) Min: 29 min      DN      Prone PA 1 6 bilat L/s and SI jt   GISTM/STM      Lumbar Manip 1 4 SL- L opening   L hip mobs and stretching with belt and prone ER mobs 1 5    LAD 1 4 bilat   Hip LA distraction      IASTM 1 10 Medial and lateral hamstring tendon and belly, L greater trochanter         NMR re-education (05628)   Min:      Mf Activation- re-ed      TrA Re-ed activation      Glute Max re-ed activation      Prone ira Rodgers            Therapeutic Activity (45081) Min:                                Therapeutic Exercise and NMR EXR  [x] (19214) Provided verbal/tactile cueing for activities related to strengthening, flexibility, endurance, ROM  for improvements in proximal hip and core control with self care, mobility, lifting and ambulation. [x] (17864) Provided verbal/tactile cueing for activities related to improving balance, coordination, kinesthetic sense, posture, motor skill, proprioception  to assist with core control in self care, mobility, lifting, and ambulation.      Therapeutic Activities:    [] (61665 or 98551) Provided verbal/tactile cueing for activities related to improving balance, coordination, kinesthetic sense, posture, motor skill, proprioception and motor activation to allow for proper function  with self care and ADLs  [] (70303) Provided training and instruction to the patient for proper core and proximal hip recruitment and positioning with ambulation re-education     Home Exercise Program:    [x] (47852) Reviewed/Progressed HEP activities related to strengthening, flexibility, endurance, ROM of core, proximal hip and LE for functional self-care, mobility, lifting and ambulation   [] (12047) Reviewed/Progressed HEP activities related to improving balance, coordination, kinesthetic sense, posture, motor skill, proprioception of core, proximal hip and LE for self care, mobility, lifting, and ambulation Manual Treatments:  PROM / STM / Oscillations-Mobs:  G-I, II, III, IV (PA's, Inf., Post.)  [x] (72155) Provided manual therapy to mobilize proximal hip and LS spine soft tissue/joints for the purpose of modulating pain, promoting relaxation,  increasing ROM, reducing/eliminating soft tissue swelling/inflammation/restriction, improving soft tissue extensibility and allowing for proper ROM for normal function with self care, mobility, lifting and ambulation. Modalities:       Charges:  Timed Code Treatment Minutes: 42   Total Treatment Minutes: 42     [] EVAL (LOW) 69067 (typically 20 minutes face-to-face)  [] EVAL (MOD) 35069 (typically 30 minutes face-to-face)  [] EVAL (HIGH) 73321 (typically 45 minutes face-to-face)  [] RE-EVAL     [x] Pashto(30940) x  1   [] DRY NEEDLE 1 OR 2 MUSCLES  [] NMR (12459) x     [] DRY NEEDLE 3+ MUSCLES  [x] Manual (55229) x   2    [] TA (28509) x     [] Mech Traction (03529)  [] ES(attended) (67902)     [] ES (un) (73624):   [] VASO (66813)  [] Other:    If [facility] Please Indicate Time In/Out  CPT Code Time in Time out                                     GOALS:  Patient stated goal: reduce leg pain   [] Progressing: [] Met: [] Not Met: [] Adjusted  Therapist goals for Patient:   Short Term Goals: To be achieved in: 2 weeks  1. Independent in HEP and progression per patient tolerance, in order to prevent re-injury. [] Progressing: [] Met: [] Not Met: [] Adjusted  2. Patient will have a decrease in pain to facilitate improvement in movement, function, and ADLs as indicated by Functional Deficits. [] Progressing: [] Met: [] Not Met: [] Adjusted    Long Term Goals: To be achieved in: 4 weeks  1. Disability index score of 60% or less for the KB to assist with reaching prior level of function. [] Progressing: [] Met: [] Not Met: [] Adjusted  2.  Patient will demonstrate increased AROM to WNL, good LS mobility, good hip ROM to allow for proper joint functioning as indicated by patients MD visit [] Discharge    Electronically signed by: Jose Hagen, PT  Therapist was present, directed the patient's care, made skilled judgement, and was responsible for assessment and treatment of the patient      Note: If patient does not return for scheduled/recommended follow up visits, this note will serve as a discharge from care along with the most recent update on progress.

## 2020-10-06 ENCOUNTER — HOSPITAL ENCOUNTER (OUTPATIENT)
Dept: PHYSICAL THERAPY | Age: 75
Setting detail: THERAPIES SERIES
Discharge: HOME | End: 2020-10-06
Payer: MEDICARE

## 2020-10-06 PROCEDURE — 97140 MANUAL THERAPY 1/> REGIONS: CPT

## 2020-10-06 PROCEDURE — 97110 THERAPEUTIC EXERCISES: CPT

## 2020-10-06 NOTE — PLAN OF CARE
[address]  Phone: [phone]   Fax:     [phone]        Physical Therapy Re-Certification Plan of Care/MD UPDATE      Dear  Dr. Benita Calderon,    We had the pleasure of treating the following patient for physical therapy services at [address] [place] [address]. A summary of our findings can be found in the updated assessment below. This includes our plan of care. If you have any questions or concerns regarding these findings, please do not hesitate to contact me at the office phone number checked above.   Thank you for the referral.     Physician Signature:________________________________Date:__________________  By signing above (or electronic signature), therapists plan is approved by physician    Date Range Of Visits: 20 - 10/56/2020  Total Visits to Date: 10  Overall Response to Treatment:   [x]Patient is responding well to treatment and improvement is noted with regards  to goals   []Patient should continue to improve in reasonable time if they continue HEP   []Patient has plateaued and is no longer responding to skilled PT intervention    []Patient is getting worse and would benefit from return to referring MD   []Patient unable to adhere to initial POC   []Other:     Physical Therapy Treatment Note/ Progress Report:     Date:  10/6/2020    Patient Name:  Devon Reese    :  1945  MRN: [id number]  Restrictions/Precautions:    Medical/Treatment Diagnosis Information:  · Diagnosis: L knee pain, lumbar radiculopathy  · Treatment Diagnosis: L knee pain M25.562, LBP X86.0  Insurance/Certification information:  PT Insurance Information: Medicare/Medical [place]  Physician Information:  Referring Practitioner: Cristina Zhang of care signed (Y/N):     Date of Patient follow up with Physician:      Progress Report: [x]  Yes  []  No     Date Range for reporting period:  Beginning: Hip LA distraction      IASTM 1 15 Medial and lateral hamstring tendon and belly, L greater trochanter         NMR re-education (90021)   Min:      Mf Activation- re-ed      TrA Re-ed activation      Glute Max re-ed activation      Prone ira Rodgers            Therapeutic Activity (39413) Min:                                Therapeutic Exercise and NMR EXR  [x] (51193) Provided verbal/tactile cueing for activities related to strengthening, flexibility, endurance, ROM  for improvements in proximal hip and core control with self care, mobility, lifting and ambulation. [x] (62852) Provided verbal/tactile cueing for activities related to improving balance, coordination, kinesthetic sense, posture, motor skill, proprioception  to assist with core control in self care, mobility, lifting, and ambulation.      Therapeutic Activities:    [] (84437 or 34797) Provided verbal/tactile cueing for activities related to improving balance, coordination, kinesthetic sense, posture, motor skill, proprioception and motor activation to allow for proper function  with self care and ADLs  [] (30657) Provided training and instruction to the patient for proper core and proximal hip recruitment and positioning with ambulation re-education     Home Exercise Program:    [x] (20560) Reviewed/Progressed HEP activities related to strengthening, flexibility, endurance, ROM of core, proximal hip and LE for functional self-care, mobility, lifting and ambulation   [] (39369) Reviewed/Progressed HEP activities related to improving balance, coordination, kinesthetic sense, posture, motor skill, proprioception of core, proximal hip and LE for self care, mobility, lifting, and ambulation      Manual Treatments:  PROM / STM / Oscillations-Mobs:  G-I, II, III, IV (PA's, Inf., Post.)  [x] (20339) Provided manual therapy to mobilize proximal hip and LS spine soft tissue/joints for the purpose of modulating pain, promoting relaxation,  increasing ROM, reducing/eliminating soft tissue swelling/inflammation/restriction, improving soft tissue extensibility and allowing for proper ROM for normal function with self care, mobility, lifting and ambulation. Modalities:       Charges:  Timed Code Treatment Minutes: 40   Total Treatment Minutes: 40     [] EVAL (LOW) 21027 (typically 20 minutes face-to-face)  [] EVAL (MOD) 86103 (typically 30 minutes face-to-face)  [] EVAL (HIGH) 95412 (typically 45 minutes face-to-face)  [] RE-EVAL     [x] JI(17588) x  1   [] DRY NEEDLE 1 OR 2 MUSCLES  [] NMR (95979) x     [] DRY NEEDLE 3+ MUSCLES  [x] Manual (35600) x   2    [] TA (74539) x     [] Mech Traction (21925)  [] ES(attended) (76535)     [] ES (un) (68746):   [] VASO (57939)  [] Other:    If [facility] Please Indicate Time In/Out  CPT Code Time in Time out                                     GOALS:  Patient stated goal: reduce leg pain   [] Progressing: [x] Met: [] Not Met: [] Adjusted  Therapist goals for Patient:   Short Term Goals: To be achieved in: 2 weeks  1. Independent in HEP and progression per patient tolerance, in order to prevent re-injury. [] Progressing: [x] Met: [] Not Met: [] Adjusted  2. Patient will have a decrease in pain to facilitate improvement in movement, function, and ADLs as indicated by Functional Deficits. [] Progressing: [x] Met: [] Not Met: [] Adjusted    Long Term Goals: To be achieved in: 4 weeks  1. Disability index score of 60% or less for the KB to assist with reaching prior level of function. [x] Progressing: [] Met: [] Not Met: [] Adjusted  2. Patient will demonstrate increased AROM to WNL, good LS mobility, good hip ROM to allow for proper joint functioning as indicated by patients Functional Deficits. [x] Progressing: [] Met: [] Not Met: [] Adjusted  3. Patient will demonstrate an increase in Strength to good proximal hip and core activation to allow for proper functional mobility as indicated by patients Functional Deficits. Plan of care initiated [] Hold pending MD visit [] Discharge    Electronically signed by: BABS Holland  Therapist was present, directed the patient's care, made skilled judgement, and was responsible for assessment and treatment of the patient      Note: If patient does not return for scheduled/recommended follow up visits, this note will serve as a discharge from care along with the most recent update on progress.

## 2020-10-08 ENCOUNTER — HOSPITAL ENCOUNTER (OUTPATIENT)
Dept: PHYSICAL THERAPY | Age: 75
Setting detail: THERAPIES SERIES
Discharge: HOME | End: 2020-10-08
Payer: MEDICARE

## 2020-10-08 PROCEDURE — 97110 THERAPEUTIC EXERCISES: CPT

## 2020-10-08 PROCEDURE — 97140 MANUAL THERAPY 1/> REGIONS: CPT

## 2020-10-08 NOTE — FLOWSHEET NOTE
[address]  Phone: [phone]   Fax:     [phone]        Physical Therapy Re-Certification Plan of Care/MD UPDATE        Date:  10/8/2020    Patient Name:  Jimenez Pardo    :  1945  MRN: [id number]  Restrictions/Precautions:    Medical/Treatment Diagnosis Information:  · Diagnosis: L knee pain, lumbar radiculopathy  · Treatment Diagnosis: L knee pain M25.562, LBP F51.2  Insurance/Certification information:  PT Insurance Information: Medicare/Medical [place]  Physician Information:  Referring Practitioner: Merlyn Bloom of care signed (Y/N):     Date of Patient follow up with Physician:      Progress Report: []  Yes  [x]  No     Date Range for reporting period:  Beginning: 10/8/20  Ending:     Progress report due (10 Rx/or 30 days whichever is less):     Recertification due (POC duration/ or 90 days whichever is less):2020     Visit # Insurance Allowable Auth Needed   11 Medicare/Med [place] []Yes    [x]No     Pain level:  2.5/10   Functional Scale: LEFS 61.25% disability   Date Assessed: 10/6/2020    SUBJECTIVE:  Patient reports feeling good but is having some R piriformis pain as well as some lingering soreness in L hamstring. Pt reports doctor appt next Tuesday for back and will plan to have one session next week.   OBJECTIVE:    Observation: antalgic gait   Test measurements:             RESTRICTIONS/PRECAUTIONS: none    Exercises/Interventions:     Therapeutic Ex (92383)   Min: 10 min sets/sec reps notes   Hip ER stretch 30 4    Bridge  3-5sec 20    Kneeling Alt Arm-Leg         Supine nerve gliding/hamstring Side planks       Kneeling hip abd/ext      1/2 kneeling down chop      Std band pull down      Step ups 3 10 4\" step   Lateral band walk      Bosu Lunge      Hip Flex stretch            Manual Intervention (73892) Min: 30 min      DN      Prone PA 1 5 bilat L/s and SI jt   STM 1 5 R piriformis   Lumbar Manip 1 0 SL- L opening   PROM  1 5 Prone IR/ER   LAD 1 0 bilat   Hip LA distraction      STM/IASTM 1 15 Medial and lateral hamstring tendon and belly         NMR re-education (69743)   Min:      Mf Activation- re-ed      TrA Re-ed activation      Glute Max re-ed activation      Prone ira Rodgers            Therapeutic Activity (64999) Min:                                Therapeutic Exercise and NMR EXR  [x] (34501) Provided verbal/tactile cueing for activities related to strengthening, flexibility, endurance, ROM  for improvements in proximal hip and core control with self care, mobility, lifting and ambulation. [x] (02677) Provided verbal/tactile cueing for activities related to improving balance, coordination, kinesthetic sense, posture, motor skill, proprioception  to assist with core control in self care, mobility, lifting, and ambulation.      Therapeutic Activities:    [] (21382 or 17471) Provided verbal/tactile cueing for activities related to improving balance, coordination, kinesthetic sense, posture, motor skill, proprioception and motor activation to allow for proper function  with self care and ADLs  [] (92439) Provided training and instruction to the patient for proper core and proximal hip recruitment and positioning with ambulation re-education     Home Exercise Program:    [x] (97952) Reviewed/Progressed HEP activities related to strengthening, flexibility, endurance, ROM of core, proximal hip and LE for functional self-care, mobility, lifting and ambulation   [] (89270) Reviewed/Progressed HEP activities related to improving balance, coordination, kinesthetic sense, posture, motor skill, proprioception of core, proximal hip and LE for self care, mobility, lifting, and ambulation      Manual Treatments:  PROM / STM / Oscillations-Mobs:  G-I, II, III, IV (PA's, Inf., Post.)  [x] (32550) Provided manual therapy to mobilize proximal hip and LS spine soft tissue/joints for the purpose of modulating pain, promoting relaxation,  increasing ROM, reducing/eliminating soft tissue swelling/inflammation/restriction, improving soft tissue extensibility and allowing for proper ROM for normal function with self care, mobility, lifting and ambulation. Modalities:       Charges:  Timed Code Treatment Minutes: 40   Total Treatment Minutes: 40     [] EVAL (LOW) 81460 (typically 20 minutes face-to-face)  [] EVAL (MOD) 12863 (typically 30 minutes face-to-face)  [] EVAL (HIGH) 48590 (typically 45 minutes face-to-face)  [] RE-EVAL     [x] VW(71775) x  1   [] DRY NEEDLE 1 OR 2 MUSCLES  [] NMR (34847) x     [] DRY NEEDLE 3+ MUSCLES  [x] Manual (90477) x   2    [] TA (29099) x     [] Mech Traction (20011)  [] ES(attended) (49522)     [] ES (un) (08338):   [] VASO (13456)  [] Other:    If [facility] Please Indicate Time In/Out  CPT Code Time in Time out                                     GOALS:  Patient stated goal: reduce leg pain   [] Progressing: [x] Met: [] Not Met: [] Adjusted  Therapist goals for Patient:   Short Term Goals: To be achieved in: 2 weeks  1. Independent in HEP and progression per patient tolerance, in order to prevent re-injury. [] Progressing: [x] Met: [] Not Met: [] Adjusted  2. Patient will have a decrease in pain to facilitate improvement in movement, function, and ADLs as indicated by Functional Deficits. [] Progressing: [x] Met: [] Not Met: [] Adjusted    Long Term Goals: To be achieved in: 4 weeks  1. Disability index score of 60% or less for the KB to assist with reaching prior level of function. [x] Progressing: [] Met: [] Not Met: [] Adjusted  2. Patient will demonstrate increased AROM to WNL, good LS mobility, good hip ROM to allow for proper joint functioning as indicated by patients Functional Deficits. [x] Progressing: [] Met: [] Not Met: [] Adjusted  3.  Patient will demonstrate an increase in Strength to good proximal hip and core activation to allow for proper functional mobility as indicated by patients Functional Deficits. [x] Progressing: [] Met: [] Not Met: [] Adjusted  4. Patient will return to sitting functional activities without increased symptoms or restriction. [x] Progressing: [] Met: [] Not Met: [] Adjusted  5. Patient will be able to ascend/descend stairs reciprocally without increased symptoms or restriction. (patient specific functional goal)    [] Progressing: [x] Met: [] Not Met: [] Adjusted    ASSESSMENT: Pt would benefit from continued BLE strengthening exercises following STM to improve LE strength during functional activities. Pt tolerated step up activity with no increase in pain. Plan to adjust plan following doc visit on Tuesday. Treatment/Activity Tolerance:  [x] Patient tolerated treatment well [] Patient limited by fatique  [] Patient limited by pain  [] Patient limited by other medical complications  [] Other:     Overall Progression Towards Functional goals/ Treatment Progress Update:  [x] Patient is progressing as expected towards functional goals listed. [] Progression is slowed due to complexities/Impairments listed. [] Progression has been slowed due to co-morbidities.   [] Plan just implemented, too soon to assess goals progression <30days   [] Goals require adjustment due to lack of progress  [] Patient is not progressing as expected and requires additional follow up with physician  [] Other:    Prognosis for POC: [x] Good [] Fair  [] Poor    Patient requires continued skilled intervention: [x] Yes  [] No        PLAN: 2x/wk for 4-6 wks for core and BLE strengthening  [x] Continue per plan of care [] Alter current plan (see comments)  [] Plan of care initiated [] Hold pending MD visit [] Discharge    Electronically signed by: Karon Dixon [facility]  Therapist was present, directed the patient's care, made skilled judgement, and was responsible for assessment and treatment of the patient      Note: If patient does not

## 2020-10-13 ENCOUNTER — VIRTUAL VISIT (OUTPATIENT)
Dept: ORTHOPEDIC SURGERY | Age: 75
End: 2020-10-13
Payer: MEDICARE

## 2020-10-13 PROCEDURE — G8427 DOCREV CUR MEDS BY ELIG CLIN: HCPCS | Performed by: PHYSICAL MEDICINE & REHABILITATION

## 2020-10-13 PROCEDURE — 1123F ACP DISCUSS/DSCN MKR DOCD: CPT | Performed by: PHYSICAL MEDICINE & REHABILITATION

## 2020-10-13 PROCEDURE — 3017F COLORECTAL CA SCREEN DOC REV: CPT | Performed by: PHYSICAL MEDICINE & REHABILITATION

## 2020-10-13 PROCEDURE — 1090F PRES/ABSN URINE INCON ASSESS: CPT | Performed by: PHYSICAL MEDICINE & REHABILITATION

## 2020-10-13 PROCEDURE — 99203 OFFICE O/P NEW LOW 30 MIN: CPT | Performed by: PHYSICAL MEDICINE & REHABILITATION

## 2020-10-13 PROCEDURE — 4040F PNEUMOC VAC/ADMIN/RCVD: CPT | Performed by: PHYSICAL MEDICINE & REHABILITATION

## 2020-10-13 PROCEDURE — G8400 PT W/DXA NO RESULTS DOC: HCPCS | Performed by: PHYSICAL MEDICINE & REHABILITATION

## 2020-10-13 RX ORDER — FAMOTIDINE 20 MG/1
20 TABLET, FILM COATED ORAL 2 TIMES DAILY
COMMUNITY
Start: 2020-09-03 | End: 2022-03-23

## 2020-10-13 RX ORDER — ATORVASTATIN CALCIUM 10 MG/1
20 TABLET, FILM COATED ORAL DAILY
COMMUNITY
End: 2021-03-17

## 2020-10-13 NOTE — PROGRESS NOTES
New Patient: SPINE - Telehealth Visit (Audiovisual present)      Referring Provider:  Karrie Crystal DO    Chief Complaint   Patient presents with    Lower Back Pain     NP LSP        HISTORY OF PRESENT ILLNESS:      · The patient is being sent at the request of Karrie Crystal DO in consultation as a new spine patient for low back pain and left leg pain The patient is a 76 y.o. female whom reports left thigh and popliteal fossa pain. This pain started 2 months ago without inciting event. She rates her pain 4/10 and describes it as sharp and aching. The following activities exacerbate her symptoms: ambulating stairs, walking, standing. Her symptoms are described as constant. Treatment history has included: physical therapy, chiropractic care, oral medications, ice, heat, activity modifications. Associated signs and symptoms:   Neurogenic bowel or bladder symptoms:  no   Perceived weakness:  no   Difficulty walking:  yes    Recent Imaging (within past one year)   Xrays: yes   MRI or CT of spine: yes    Current/Past Treatment:   · Physical Therapy:  yes  · Chiropractic:  yes  · Injection:  none  · Medications:   NSAIDS:  yes   Muscle relaxer:  yes   Steriods:  none   Neuropathic medications:  none   Opioids:  none  · Previous surgery:  no  · Previous surgical consult:  no  · Other:  · Infection control  · Tested positive for MRSA in past 12 months:  no  · Tested positive for MSSA \"staph infection\" in past 12 months: no  · Tested positive for VRE (Vancomycin Resistant Enterococci) in past 12 months:   no  · Currently on any antibiotics for an infection: no  · Anticoagulants:  · On a blood thinner:  yes   · Any history of bleeding disorder: no   · MRI Contraindication: no   · Previous Pain Management: no     Past medical, surgical, social and family history reviewed with the patient.  No pertinent relevant history            Past Medical History:   Past Medical History:   Diagnosis Date    Arthritis     Asthma     Diabetes mellitus ([facility] [address]     Hyperlipidemia     Hypertension     Reflux     Sleep apnea     cpap    Thyroid disease       Past Surgical History:     Past Surgical History:   Procedure Laterality Date    CHOLECYSTECTOMY      COLONOSCOPY      SHOULDER ARTHROSCOPY Right 4/21/15    SUBACROMIAL DECOMPRESSION, DISTAL CLAVICLE EXCISION, ROTATOR CUFF REPAIR, BICEPS TENODESIS    TONSILLECTOMY      UMBILICAL HERNIA REPAIR       Current Medications:     Current Outpatient Medications:     atorvastatin (LIPITOR) 10 MG tablet, Take 20 mg by mouth daily, Disp: , Rfl:     famotidine (PEPCID) 20 MG tablet, Take 20 mg by mouth 2 times daily, Disp: , Rfl:     aspirin (ASPIR-81) 81 MG EC tablet, Aspir-81  take one at night, Disp: , Rfl:     GEMFIBROZIL PO, Take by mouth, Disp: , Rfl:     loratadine (CLARITIN) 10 MG tablet, Claritin 10 mg tablet  Take 1 tablet every day by oral route in the morning., Disp: , Rfl:     guaiFENesin (MUCINEX MAXIMUM STRENGTH) 1200 MG TB12, Mucinex 1,200 mg tablet, extended release  Take 1 tablet twice a day by oral route., Disp: , Rfl:     Probiotic Product (PROBIOTIC-10 PO), Probiotic  1 x day, Disp: , Rfl:     NUTRITIONAL SUPPLEMENTS PO, Take by mouth, Disp: , Rfl:     MULTIPLE VITAMINS-MINERALS ER PO, Take by mouth, Disp: , Rfl:     Magnesium 100 MG CAPS, magnesium  500mg one daily, Disp: , Rfl:     Multiple Minerals-Vitamins (CITRACAL PLUS BONE DENSITY PO), Citracal + Bone Density  2 scoops daily, Disp: , Rfl:     Coenzyme Q10 10 MG CAPS, Take 1 capsule by mouth, Disp: , Rfl:     Cranberry-Vitamin C-Probiotic (AZO CRANBERRY) 250-30 MG TABS, Azo Cranberry + Probiotic, Disp: , Rfl:     B COMPLEX VITAMINS ER PO, Take 2 tablets by mouth, Disp: , Rfl:     Omega-3 Fatty Acids (FISH OIL PO), Take by mouth, Disp: , Rfl:     Omega-3 Fatty Acids (FISH OIL) 1000 MG CAPS, Fish Oil  500mg  3 x day, Disp: , Rfl:     atenolol (TENORMIN) 50 MG tablet, Take 50 mg by mouth daily. , Disp: , Rfl:     cyclobenzaprine (FLEXERIL) 10 MG tablet, Take 10 mg by mouth., Disp: , Rfl:     insulin lispro (HUMALOG) 100 UNIT/ML pen, Inject  into the skin., Disp: , Rfl:     insulin NPH (HUMULIN N) 100 UNIT/ML injection vial, Inject  into the skin., Disp: , Rfl:     levothyroxine (SYNTHROID) 100 MCG tablet, Take 100 mcg by mouth., Disp: , Rfl:     lisinopril (PRINIVIL;ZESTRIL) 20 MG tablet, Take 20 mg by mouth., Disp: , Rfl:     metFORMIN (GLUCOPHAGE) 500 MG tablet, Take 500 mg by mouth., Disp: , Rfl:     nateglinide (STARLIX) 120 MG tablet, Take 120 mg by mouth., Disp: , Rfl:     omeprazole (PRILOSEC) 40 MG capsule, Take 40 mg by mouth., Disp: , Rfl:     triamterene-hydrochlorothiazide (DYAZIDE) 37.5-25 MG per capsule, Take 1 capsule by mouth., Disp: , Rfl:     zolpidem (AMBIEN) 10 MG tablet, Take 15 mg by mouth., Disp: , Rfl:   Allergies:  Atorvastatin; Adhesive tape; Codeine; and Sulfa antibiotics  Social History:    reports that she has never smoked. She has never used smokeless tobacco. She reports current alcohol use. She reports that she does not use drugs. Family History:   Family History   Problem Relation Age of Onset    Hypertension Mother     Cancer Father          REVIEW OF SYSTEMS: ROS - 14 point    Constitutional: No fevers, chills, night sweats, unexplained weight loss  Eye: No vision changes or diplopia  ENT: No nasal congestion, postnasal drip or sore throat.  No tinnitus  Respiratory: No cough or SOB  CV: No chest pain or palpitations  GI: No nausea, abdominal pain, stool changes  : No dysuria or hematuria  Skin: No new or changing skin lesions, no rashes  MSK: No joint swelling, morning stiffness, unusual joint pain  Neurological: No headache, confusion, syncope  Psychiatric: No excessive anxiety or depression  Endocrine: No polyuria or polydipsia  Hematologic: No lymph node enlargement or excessive bleeding  Immunologic:No history of immune deficiency or immunomodulating drugs         PHYSICAL EXAM:  Limitations present due to Telehealth  Vitals:   Patient-Reported Vitals 10/13/2020   Patient-Reported Weight 100.7KG   Patient-Reported Height 1.575M   Patient-Reported Pulse 72   Patient-Reported Temperature 97.2          GENERAL EXAM:  · General Apparence: Patient is adequately groomed with no evidence of malnutrition. · Psychiatric: Orientation: The patient is oriented to time, place and person. The patient's mood and affect are appropriate   · Vascular: Examination reveals no swelling and palpation reveals no tenderness in upper or lower extremities. Sensation is intact without deficit in the upper and lower extremities to light touch   · Coordination of the upper and lower extremities are normal.  · Additional Examinations:  · RIGHT UPPER EXTREMITY:  Inspection/examination of the right upper extremity does not show any tenderness, deformity or injury. Range of motion is normal and pain-free. There is no gross instability. There are no rashes, ulcerations or lesions. Strength and tone are normal. No atrophy or abnormal movements are noted. · LEFT UPPER EXTREMITY: Inspection/examination of the left upper extremity does not show any tenderness, deformity or injury. Range of motion is normal and pain-free. There is no gross instability. There are no rashes, ulcerations or lesions. Strength and tone are normal. No atrophy or abnormal movements are noted. LUMBAR/SACRAL EXAMINATION:  · Inspection: Local inspection shows no step-off or bruising. Lumbar alignment is normal.   · Palpation by patient:   No evidence of tenderness at the midline. Lumbar paraspinal tenderness Mild L4/5 and L5/S1 tenderness  Bursal tenderness No tenderness bilaterally  There is no paraspinal spasm.   · Range of Motion: pain-free ROM  · Strength:   Strength testing is at least 4/5 in all muscle groups tested based on visual exam  · Special Tests:   Straight leg raise + on leftand crossed SLR Sodium 136 135 - 145 mEq/L    Potassium 4.2 3.6 - 5.1 mEq/L    Chloride 101 101 - 111 mEq/L    CO2 24 22 - 29 mmol/L    GFR Non-African American 74 >59 mL/min/1.73 m2    GFR African American 85 >59 mL/min/1.73 m2    Anion Gap 15 6 - 18 mmol/L   Vitamin D 25 Hydroxy   Result Value Ref Range    Vit D, 25-Hydroxy 45.5 >29 ng/mL   PTH, Intact   Result Value Ref Range    PTH 47.47 15 - 65 pg/mL       Impression (Medical Decision Making):       1. Lumbar radiculopathy    2. HNP (herniated nucleus pulposus), lumbar    3. Spondylosis without myelopathy or radiculopathy, lumbar region        Plan (Medical Decision Making):    I discussed the diagnosis and the treatment options with Pat Simons today. In Summary:  The various treatment options were outlined and discussed with Pat Simons including:  Conservative care options: physical therapy, ice, medications, bracing, and activity modification. The indications for therapeutic injections. The indications for additional imaging/laboratory studies. The indications for (possible future) interventions. After considering the various options discussed, Marie Pabloharitha [place] elected to pursue a course of treatment that includes the followin. Medications: Continue anti-inflammatories with appropriate GI Precautions including to stop if develop dark tarry stools or GI upset and to take with food. 2. PT:  Encouraged to continue with Home exercise program.    3. Further studies: COVID-19 PCR testing      4. Interventional:  We discussed pursuing a Left L4 and L5 TF epidural steroid injection to address the pain. Radiologic imaging and symptoms confirm the pain etiology. Risks, benefits and alternatives of interventional options were discussed. These include and are not limited to bleeding, infection, spinal headache, nerve injury, increased pain and lack of pain relief. The patient verbalized understanding and would like to proceed.   The patient will be scheduled accordingly. 5.  Follow up:  2-3 weeks    Marie Cox was instructed to call the office if her symptoms worsen or if new symptoms appear prior to the next scheduled visit. She is specifically instructed to contact the office between now & her scheduled appointment if she has concerns related to her condition or if she needs assistance in scheduling the above tests. She is welcome to call for an appointment sooner if she has any additional concerns or questions. Chapo Martínez ATC, am scribing for Dr. Neema Hanson. 04/28/20 4:11 PM Pedro Guerrero ATC     The physical examination was performed between the patient and Dr. Neema Hanson. All counseling during the appointment was performed between the patient and provider. I, Dr. Tomasa Mercado, personally performed the services described in this documentation as scribed by ORION Carmona and it is both accurate and complete. Jet Richardson. Wai Heredia MD, CHAVA, [facility]  Board Certified in [address] [place] [address] Certified and Fellowship Trained in [facility] ([facility])     This dictation was performed with a verbal recognition program [facility]) and it was checked for errors. It is possible that there are still dictated errors within this office note. If so, please bring any errors to my attention for an addendum. All efforts were made to ensure that this office note is accurate. Patient has verbally accepted the following: We confirm that, for purposes of billing, this is a virtual visit with the provider for which we will submit a claim for reimbursement with the insurance company. The patient accepts responsibility for any co-pays, coinsurance amounts or other amounts not covered by the insurance company. Patient location: [facility]   Physician Not billed by time.   Present on the call: myself and patient          Pursuant to the emergency declaration under the [facility], [facility] waiver authority and the [employer] Act, this Virtual  Visit was conducted, with patient's consent, to reduce the patient's risk of exposure to COVID-19 and provide continuity of care for an established patient. Services were provided through a video synchronous discussion virtually to substitute for in-person clinic visit. We have confirmed that, for purposes of billing, this is a virtual visit with for which we will submit a claim for reimbursement with your insurance company. The patient has accepted responsibility for any copays, coinsurance amounts or other amounts not covered by their insurance company. This visit was completed virtually using doxy. me.

## 2020-10-15 ENCOUNTER — HOSPITAL ENCOUNTER (OUTPATIENT)
Dept: PHYSICAL THERAPY | Age: 75
Setting detail: THERAPIES SERIES
Discharge: HOME | End: 2020-10-15
Payer: MEDICARE

## 2020-10-15 PROCEDURE — 97140 MANUAL THERAPY 1/> REGIONS: CPT

## 2020-10-15 PROCEDURE — 97110 THERAPEUTIC EXERCISES: CPT

## 2020-10-15 NOTE — FLOWSHEET NOTE
[address]  Phone: [phone]   Fax:     [phone]        Physical Therapy Re-Certification Plan of Care/MD UPDATE        Date:  10/15/2020    Patient Name:  Luis Brown    :  1945  MRN: [id number]  Restrictions/Precautions:    Medical/Treatment Diagnosis Information:  · Diagnosis: L knee pain, lumbar radiculopathy  · Treatment Diagnosis: L knee pain M25.562, LBP E53.9  Insurance/Certification information:  PT Insurance Information: Medicare/Medical [place]  Physician Information:  Referring Practitioner: Martine Abraham of care signed (Y/N):     Date of Patient follow up with Physician:      Progress Report: []  Yes  [x]  No     Date Range for reporting period:  Beginning: 10/8/20  Ending:     Progress report due (10 Rx/or 30 days whichever is less): 59    Recertification due (POC duration/ or 90 days whichever is less):2020     Visit # Insurance Allowable Auth Needed   12 Medicare/Med [place] []Yes    [x]No     Pain level:  2.5/10   Functional Scale: LEFS 61.25% disability   Date Assessed: 10/6/2020    SUBJECTIVE:  Patient reports hamstrings feeling \"okay. \" But low back and L hip pain as of recently and had a difficult time falling asleep last night. States she had virtual visit with Dr Isabella Cerda. Plans to hold off on injection at this time.      OBJECTIVE:    Observation: antalgic gait, increased QL restriction on R   Test measurements:        RESTRICTIONS/PRECAUTIONS: none    Exercises/Interventions:     Therapeutic Ex (77286)   Min: 15 min sets/sec reps notes   Hip ER stretch 30 4    Hip Ext 2 10    Bridge  3-5sec 20    Kneeling Alt Arm-Leg         Supine nerve gliding/hamstring Side planks      Kneeling hip abd/ext      1/2 kneeling down chop      Std band pull down      Lateral band walk      Bosu Lunge      Hip Flex stretch      Supine marches 2 10       Manual Intervention (07256) Min: and LS spine soft tissue/joints for the purpose of modulating pain, promoting relaxation,  increasing ROM, reducing/eliminating soft tissue swelling/inflammation/restriction, improving soft tissue extensibility and allowing for proper ROM for normal function with self care, mobility, lifting and ambulation. Modalities:       Charges:  Timed Code Treatment Minutes: 45   Total Treatment Minutes: 45     [] EVAL (LOW) 67960 (typically 20 minutes face-to-face)  [] EVAL (MOD) 82417 (typically 30 minutes face-to-face)  [] EVAL (HIGH) 13580 (typically 45 minutes face-to-face)  [] RE-EVAL     [x] PE(72700) x  1   [] DRY NEEDLE 1 OR 2 MUSCLES  [] NMR (00158) x     [] DRY NEEDLE 3+ MUSCLES  [x] Manual (03888) x   2    [] TA (96866) x     [] Mech Traction (61164)  [] ES(attended) (32619)     [] ES (un) (14427):   [] VASO (93712)  [] Other:    If [facility] Please Indicate Time In/Out  CPT Code Time in Time out                                     GOALS:  Patient stated goal: reduce leg pain   [] Progressing: [x] Met: [] Not Met: [] Adjusted  Therapist goals for Patient:   Short Term Goals: To be achieved in: 2 weeks  1. Independent in HEP and progression per patient tolerance, in order to prevent re-injury. [] Progressing: [x] Met: [] Not Met: [] Adjusted  2. Patient will have a decrease in pain to facilitate improvement in movement, function, and ADLs as indicated by Functional Deficits. [] Progressing: [x] Met: [] Not Met: [] Adjusted    Long Term Goals: To be achieved in: 4 weeks  1. Disability index score of 60% or less for the KB to assist with reaching prior level of function. [x] Progressing: [] Met: [] Not Met: [] Adjusted  2. Patient will demonstrate increased AROM to WNL, good LS mobility, good hip ROM to allow for proper joint functioning as indicated by patients Functional Deficits. [x] Progressing: [] Met: [] Not Met: [] Adjusted  3.  Patient will demonstrate an increase in Strength to good proximal hip and core activation to allow for proper functional mobility as indicated by patients Functional Deficits. [x] Progressing: [] Met: [] Not Met: [] Adjusted  4. Patient will return to sitting functional activities without increased symptoms or restriction. [x] Progressing: [] Met: [] Not Met: [] Adjusted  5. Patient will be able to ascend/descend stairs reciprocally without increased symptoms or restriction. (patient specific functional goal)    [] Progressing: [x] Met: [] Not Met: [] Adjusted    ASSESSMENT: Good tolerance to session. Continue with core and LE strengthening and decrease to 1x/wk. Treatment/Activity Tolerance:  [x] Patient tolerated treatment well [] Patient limited by fatique  [] Patient limited by pain  [] Patient limited by other medical complications  [] Other:     Overall Progression Towards Functional goals/ Treatment Progress Update:  [x] Patient is progressing as expected towards functional goals listed. [] Progression is slowed due to complexities/Impairments listed. [] Progression has been slowed due to co-morbidities.   [] Plan just implemented, too soon to assess goals progression <30days   [] Goals require adjustment due to lack of progress  [] Patient is not progressing as expected and requires additional follow up with physician  [] Other:    Prognosis for POC: [x] Good [] Fair  [] Poor    Patient requires continued skilled intervention: [x] Yes  [] No        PLAN: 2x/wk for 4-6 wks for core and BLE strengthening  [x] Continue per plan of care [] Alter current plan (see comments)  [] Plan of care initiated [] Hold pending MD visit [] Discharge    Electronically signed by: [facility]  Therapist was present, directed the patient's care, made skilled judgement, and was responsible for assessment and treatment of the patient      Note: If patient does not return for scheduled/recommended follow up visits, this note will serve as a discharge from care along with the most recent update on progress.

## 2020-10-20 ENCOUNTER — APPOINTMENT (OUTPATIENT)
Dept: PHYSICAL THERAPY | Age: 75
End: 2020-10-20
Payer: MEDICARE

## 2020-10-22 ENCOUNTER — APPOINTMENT (OUTPATIENT)
Dept: PHYSICAL THERAPY | Age: 75
End: 2020-10-22
Payer: MEDICARE

## 2020-10-29 ENCOUNTER — HOSPITAL ENCOUNTER (OUTPATIENT)
Dept: PHYSICAL THERAPY | Age: 75
Setting detail: THERAPIES SERIES
Discharge: HOME | End: 2020-10-29
Payer: MEDICARE

## 2020-10-29 PROCEDURE — 97140 MANUAL THERAPY 1/> REGIONS: CPT

## 2020-10-29 NOTE — FLOWSHEET NOTE
[address]  Phone: [phone]   Fax:     [phone]        Physical Therapy Re-Certification Plan of Care/MD UPDATE        Date:  10/29/2020    Patient Name:  Pro Giron    :  1945  MRN: [id number]  Restrictions/Precautions:    Medical/Treatment Diagnosis Information:  · Diagnosis: L knee pain, lumbar radiculopathy  · Treatment Diagnosis: L knee pain M25.562, LBP X41.4  Insurance/Certification information:  PT Insurance Information: Medicare/Medical [place]  Physician Information:  Referring Practitioner: [facility] care signed (Y/N):     Date of Patient follow up with Physician:      Progress Report: []  Yes  [x]  No     Date Range for reporting period:  Beginning: 10/8/20  Ending:     Progress report due (10 Rx/or 30 days whichever is less): 58    Recertification due (POC duration/ or 90 days whichever is less):2020     Visit # Insurance Allowable Auth Needed   13 Medicare/Med [place] []Yes    [x]No     Pain level:  2.5/10   Functional Scale: LEFS 61.25% disability   Date Assessed: 10/6/2020    SUBJECTIVE:  Patient reports having some soreness following chiropractor session yesterday. Pt noted some pain in medial L hamstring & adductors.      OBJECTIVE:    Observation: antalgic gait, increased QL restriction on R   Test measurements:        RESTRICTIONS/PRECAUTIONS: none    Exercises/Interventions:     Therapeutic Ex (86303)   Min: 5 min sets/sec reps notes         Bridge  3-5sec 20    Kneeling Alt Arm-Leg         Supine nerve gliding/hamstring Side planks      Kneeling hip abd/ext      1/2 kneeling down chop      Std band pull down      Lateral band walk      Bosu Lunge      Hip Flex stretch      Supine marches 2 10       Manual Intervention (50740) Min: 40 min      DN      Prone PA 1 10 bilat L/s and SI jt   STM 1 0 R piriformis   Lumbar Manip 1 0 SL- L opening   PROM  1 15 Prone IR/ER   LAD 1 0 bilat   Hip LA distraction      STM/IASTM 1 15 L medial HS, adductors         NMR re-education (75535)   Min:      Mf Activation- re-ed      TrA Re-ed activation      Glute Max re-ed activation      Prone ira Rodgers            Therapeutic Activity (06308) Min:                                Therapeutic Exercise and NMR EXR  [x] (05958) Provided verbal/tactile cueing for activities related to strengthening, flexibility, endurance, ROM  for improvements in proximal hip and core control with self care, mobility, lifting and ambulation. [x] (92428) Provided verbal/tactile cueing for activities related to improving balance, coordination, kinesthetic sense, posture, motor skill, proprioception  to assist with core control in self care, mobility, lifting, and ambulation.      Therapeutic Activities:    [] (19308 or 51046) Provided verbal/tactile cueing for activities related to improving balance, coordination, kinesthetic sense, posture, motor skill, proprioception and motor activation to allow for proper function  with self care and ADLs  [] (63407) Provided training and instruction to the patient for proper core and proximal hip recruitment and positioning with ambulation re-education     Home Exercise Program:    [x] (50677) Reviewed/Progressed HEP activities related to strengthening, flexibility, endurance, ROM of core, proximal hip and LE for functional self-care, mobility, lifting and ambulation   [] (98769) Reviewed/Progressed HEP activities related to improving balance, coordination, kinesthetic sense, posture, motor skill, proprioception of core, proximal hip and LE for self care, mobility, lifting, and ambulation      Manual Treatments:  PROM / STM / Oscillations-Mobs:  G-I, II, III, IV (PA's, Inf., Post.)  [x] (52437) Provided manual therapy to mobilize proximal hip and LS spine soft tissue/joints for the purpose of modulating pain, promoting relaxation,  increasing ROM, Progressing: [] Met: [] Not Met: [] Adjusted  4. Patient will return to sitting functional activities without increased symptoms or restriction. [x] Progressing: [] Met: [] Not Met: [] Adjusted  5. Patient will be able to ascend/descend stairs reciprocally without increased symptoms or restriction. (patient specific functional goal)    [] Progressing: [x] Met: [] Not Met: [] Adjusted    ASSESSMENT: Good tolerance to session. Pt noted HS soreness decreased following manual. Pt reports to continue with HEP at home with plan to call for questions or concerns as needed. Treatment/Activity Tolerance:  [x] Patient tolerated treatment well [] Patient limited by fatique  [] Patient limited by pain  [] Patient limited by other medical complications  [] Other:     Overall Progression Towards Functional goals/ Treatment Progress Update:  [x] Patient is progressing as expected towards functional goals listed. [] Progression is slowed due to complexities/Impairments listed. [] Progression has been slowed due to co-morbidities.   [] Plan just implemented, too soon to assess goals progression <30days   [] Goals require adjustment due to lack of progress  [] Patient is not progressing as expected and requires additional follow up with physician  [] Other:    Prognosis for POC: [x] Good [] Fair  [] Poor    Patient requires continued skilled intervention: [x] Yes  [] No        PLAN: 2x/wk for 4-6 wks for core and BLE strengthening  [x] Continue per plan of care [] Alter current plan (see comments)  [] Plan of care initiated [] Hold pending MD visit [] Discharge    Electronically signed by: Sol Meyers, PT, Lyudmila Huffman, SPT  Therapist was present, directed the patient's care, made skilled judgement, and was responsible for assessment and treatment of the patient      Note: If patient does not return for scheduled/recommended follow up visits, this note will serve as a discharge from care along with the most recent update on progress.

## 2020-11-12 ENCOUNTER — HOSPITAL ENCOUNTER (OUTPATIENT)
Dept: PHYSICAL THERAPY | Age: 75
Setting detail: THERAPIES SERIES
Discharge: HOME | End: 2020-11-12
Payer: MEDICARE

## 2020-11-12 PROCEDURE — 97032 APPL MODALITY 1+ESTIM EA 15: CPT

## 2020-11-12 PROCEDURE — 20560 NDL INSJ W/O NJX 1 OR 2 MUSC: CPT

## 2020-11-12 PROCEDURE — 97140 MANUAL THERAPY 1/> REGIONS: CPT

## 2020-11-12 NOTE — FLOWSHEET NOTE
[address]  Phone: [phone]   Fax:     [phone]      Date:  2020    Patient Name:  Toshia Meeks    :  1945  MRN: [id number]  Restrictions/Precautions:    Medical/Treatment Diagnosis Information:  · Diagnosis: L knee pain, lumbar radiculopathy  · Treatment Diagnosis: L knee pain M25.562, LBP U39.9  Insurance/Certification information:  PT Insurance Information: Medicare/Medical [place]  Physician Information:  Referring Practitioner: Wil Headings of care signed (Y/N):     Date of Patient follow up with Physician:      Progress Report: []  Yes  [x]  No     Date Range for reporting period:  Beginning: 10/8/20  Ending:     Progress report due (10 Rx/or 30 days whichever is less): 59/3/69    Recertification due (POC duration/ or 90 days whichever is less):2020     Visit # Insurance Allowable Auth Needed   14 Medicare/Med [place] []Yes    [x]No     Pain level:  4/10   Functional Scale: LEFS 61.25% disability   Date Assessed: 10/6/2020    SUBJECTIVE:  Patient reports that she has been having more pain in back of L leg. Would like to try DN to leg today.       OBJECTIVE:    Observation: antalgic gait, increased QL restriction on R   Test measurements:        RESTRICTIONS/PRECAUTIONS: none    Exercises/Interventions:     Therapeutic Ex (40213)   Min: 0 min sets/sec reps notes         Bridge  3-5sec 20    Kneeling Alt Arm-Leg         Supine nerve gliding/hamstring Side planks      Kneeling hip abd/ext      1/2 kneeling down chop      Std band pull down      Lateral band walk      Bosu Lunge      Hip Flex stretch      Supine marches 2 10       Manual Intervention (84889) Min: 20 min      DN      Prone PA 1 10 bilat L/s and SI jt   STM 1 0 R piriformis   Lumbar Manip 1 0 SL- L opening   PROM  1 0 Prone IR/ER   LAD 1 0 bilat   Hip LA distraction      STM/IASTM 1 15 L medial HS, adductors NMR re-education (55760)   Min:      Mf Activation- re-ed      TrA Re-ed activation      Glute Max re-ed activation      Prone ira Rodgers            Therapeutic Activity (91470) Min:                  DN: 5 min 1 5min    ESTIM: 10 min 1 10min      Spoke with   regarding the use of Dry Needling     Dry needling manual therapy: consisted on the placement of 6 needles in the following muscles:  L multifidus L4/5 and L5/S1, L medial and lateral hamstring. A 60 mm needle was inserted, piston, rotated, and coned to produce intramuscular mobilization. These techniques were used to restore functional range of motion, reduce muscle spasm and induce healing in the corresponding musculature. (81161)  Clean Technique was utilized today while applying Dry needling treatment. The treatment sites where cleaned with 70% solution of  isopropyl alcohol . The PT washed their hands and utilized treatment gloves along with hand  prior to inserting the needles. All needles where removed and discarded in the appropriate sharps container. MD has given verbal and/or written approval for this treatment. Attended low frequency (1-20Hz) electrical stimulation was utilized in conjunction with Dry Needling:  the Estim was manipulated between all above needles for a period of 10 min. at 6-8 volts. The low frequency electrical stimulation was used to help reduce muscle spasm and help to interrupt /[place] the pain cycle. (62229)       Therapeutic Exercise and NMR EXR  [x] (56763) Provided verbal/tactile cueing for activities related to strengthening, flexibility, endurance, ROM  for improvements in proximal hip and core control with self care, mobility, lifting and ambulation. [x] (83874) Provided verbal/tactile cueing for activities related to improving balance, coordination, kinesthetic sense, posture, motor skill, proprioception  to assist with core control in self care, mobility, lifting, and ambulation. Therapeutic Activities:    [] (20891 or 89418) Provided verbal/tactile cueing for activities related to improving balance, coordination, kinesthetic sense, posture, motor skill, proprioception and motor activation to allow for proper function  with self care and ADLs  [] (45846) Provided training and instruction to the patient for proper core and proximal hip recruitment and positioning with ambulation re-education     Home Exercise Program:    [x] (15627) Reviewed/Progressed HEP activities related to strengthening, flexibility, endurance, ROM of core, proximal hip and LE for functional self-care, mobility, lifting and ambulation   [] (29483) Reviewed/Progressed HEP activities related to improving balance, coordination, kinesthetic sense, posture, motor skill, proprioception of core, proximal hip and LE for self care, mobility, lifting, and ambulation      Manual Treatments:  PROM / STM / Oscillations-Mobs:  G-I, II, III, IV (PA's, Inf., Post.)  [x] (81501) Provided manual therapy to mobilize proximal hip and LS spine soft tissue/joints for the purpose of modulating pain, promoting relaxation,  increasing ROM, reducing/eliminating soft tissue swelling/inflammation/restriction, improving soft tissue extensibility and allowing for proper ROM for normal function with self care, mobility, lifting and ambulation.      Modalities:       Charges:  Timed Code Treatment Minutes: 20   Total Treatment Minutes: 35     [] EVAL (LOW) 41390 (typically 20 minutes face-to-face)  [] EVAL (MOD) 79476 (typically 30 minutes face-to-face)  [] EVAL (HIGH) 17256 (typically 45 minutes face-to-face)  [] RE-EVAL     [] UU(82126) x  0  [x] DRY NEEDLE 1 OR 2 MUSCLES  [] NMR (85207) x     [] DRY NEEDLE 3+ MUSCLES  [x] Manual (21586) x   1   [] TA (44668) x     [] Mech Traction (85777)  [x] ES(attended) (39433)     [] ES (un) (99275):   [] VASO (01790)  [] Other:    If BW Please Indicate Time In/Out  CPT Code Time in Time out GOALS:  Patient stated goal: reduce leg pain   [] Progressing: [x] Met: [] Not Met: [] Adjusted  Therapist goals for Patient:   Short Term Goals: To be achieved in: 2 weeks  1. Independent in HEP and progression per patient tolerance, in order to prevent re-injury. [] Progressing: [x] Met: [] Not Met: [] Adjusted  2. Patient will have a decrease in pain to facilitate improvement in movement, function, and ADLs as indicated by Functional Deficits. [] Progressing: [x] Met: [] Not Met: [] Adjusted    Long Term Goals: To be achieved in: 4 weeks  1. Disability index score of 60% or less for the KB to assist with reaching prior level of function. [x] Progressing: [] Met: [] Not Met: [] Adjusted  2. Patient will demonstrate increased AROM to WNL, good LS mobility, good hip ROM to allow for proper joint functioning as indicated by patients Functional Deficits. [x] Progressing: [] Met: [] Not Met: [] Adjusted  3. Patient will demonstrate an increase in Strength to good proximal hip and core activation to allow for proper functional mobility as indicated by patients Functional Deficits. [x] Progressing: [] Met: [] Not Met: [] Adjusted  4. Patient will return to sitting functional activities without increased symptoms or restriction. [x] Progressing: [] Met: [] Not Met: [] Adjusted  5. Patient will be able to ascend/descend stairs reciprocally without increased symptoms or restriction. (patient specific functional goal)    [] Progressing: [x] Met: [] Not Met: [] Adjusted    ASSESSMENT: Good tolerance to session. Pt noted HS soreness decreased following manual and DN.      Treatment/Activity Tolerance:  [x] Patient tolerated treatment well [] Patient limited by fatique  [] Patient limited by pain  [] Patient limited by other medical complications  [] Other:     Overall Progression Towards Functional goals/ Treatment Progress Update:  [x] Patient is progressing as expected towards functional goals listed. [] Progression is slowed due to complexities/Impairments listed. [] Progression has been slowed due to co-morbidities. [] Plan just implemented, too soon to assess goals progression <30days   [] Goals require adjustment due to lack of progress  [] Patient is not progressing as expected and requires additional follow up with physician  [] Other:    Prognosis for POC: [x] Good [] Fair  [] Poor    Patient requires continued skilled intervention: [x] Yes  [] No        PLAN: 2x/wk for 4-6 wks for core and BLE strengthening  [x] Continue per plan of care [] Alter current plan (see comments)  [] Plan of care initiated [] Hold pending MD visit [] Discharge    Electronically signed by: Felicity Catherine PT    Note: If patient does not return for scheduled/recommended follow up visits, this note will serve as a discharge from care along with the most recent update on progress.

## 2020-11-23 ENCOUNTER — HOSPITAL ENCOUNTER (OUTPATIENT)
Dept: PHYSICAL THERAPY | Age: 75
Setting detail: THERAPIES SERIES
Discharge: HOME | End: 2020-11-23
Payer: MEDICARE

## 2020-11-23 PROCEDURE — 20560 NDL INSJ W/O NJX 1 OR 2 MUSC: CPT

## 2020-11-23 PROCEDURE — 97140 MANUAL THERAPY 1/> REGIONS: CPT

## 2020-11-23 PROCEDURE — 97032 APPL MODALITY 1+ESTIM EA 15: CPT

## 2020-11-23 NOTE — FLOWSHEET NOTE
[address]  Phone: [phone]   Fax:     [phone]      Date:  2020    Patient Name:  Anabell Araujo    :  1945  MRN: [id number]  Restrictions/Precautions:    Medical/Treatment Diagnosis Information:  · Diagnosis: L knee pain, lumbar radiculopathy  · Treatment Diagnosis: L knee pain M25.562, LBP C18.5  Insurance/Certification information:  PT Insurance Information: Medicare/Medical [place]  Physician Information:  Referring Practitioner: Gato Navas of care signed (Y/N):     Date of Patient follow up with Physician:      Progress Report: []  Yes  [x]  No     Date Range for reporting period:  Beginning: 10/8/20  Ending:     Progress report due (10 Rx/or 30 days whichever is less):     Recertification due (POC duration/ or 90 days whichever is less):2020     Visit # Insurance Allowable Auth Needed   15 Medicare/Med [place] []Yes    [x]No     Pain level:  4/10   Functional Scale: LEFS 61.25% disability   Date Assessed: 10/6/2020    SUBJECTIVE:  Patient reports that she felt better after needling last session. Reports that she would like to try again. She  has been having more pain in back of L leg. Would like to try DN to leg today.       OBJECTIVE:    Observation: antalgic gait, increased QL restriction on R   Test measurements:        RESTRICTIONS/PRECAUTIONS: none    Exercises/Interventions:     Therapeutic Ex (93855)   Min: 0 min sets/sec reps notes         Bridge  3-5sec 20    Kneeling Alt Arm-Leg         Supine nerve gliding/hamstring Side planks      Kneeling hip abd/ext      1/2 kneeling down chop      Std band pull down      Lateral band walk      Bosu Lunge      Hip Flex stretch      Supine marches 2 10       Manual Intervention (05639) Min: 19 min      DN      Prone PA 1 5 bilat L/s and SI jt   STM 1 0 R piriformis   Lumbar Manip 1 0 SL- L opening   PROM  1 0 Prone IR/ER LAD 1 4 L   Hip LA distraction      STM/IASTM 1 10 L medial HS, adductors         NMR re-education (04506)   Min:      Mf Activation- re-ed      TrA Re-ed activation      Glute Max re-ed activation      Prone ira Rodgers            Therapeutic Activity (90211) Min:                  DN: 5 min 1 5min    ESTIM: 10 min 1 10min      Spoke with   regarding the use of Dry Needling     Dry needling manual therapy: consisted on the placement of 6 needles in the following muscles:  L multifidus L4/5 and L5/S1, L medial l hamstring. A 60 mm needle was inserted, piston, rotated, and coned to produce intramuscular mobilization. These techniques were used to restore functional range of motion, reduce muscle spasm and induce healing in the corresponding musculature. (57639)  Clean Technique was utilized today while applying Dry needling treatment. The treatment sites where cleaned with 70% solution of  isopropyl alcohol . The PT washed their hands and utilized treatment gloves along with hand  prior to inserting the needles. All needles where removed and discarded in the appropriate sharps container. MD has given verbal and/or written approval for this treatment. Attended low frequency (1-20Hz) electrical stimulation was utilized in conjunction with Dry Needling:  the Estim was manipulated between all above needles for a period of 10 min. at 6-8 volts. The low frequency electrical stimulation was used to help reduce muscle spasm and help to interrupt /[place] the pain cycle. (45047)       Therapeutic Exercise and NMR EXR  [x] (44455) Provided verbal/tactile cueing for activities related to strengthening, flexibility, endurance, ROM  for improvements in proximal hip and core control with self care, mobility, lifting and ambulation.   [x] (74220) Provided verbal/tactile cueing for activities related to improving balance, coordination, kinesthetic sense, posture, motor skill, proprioception  to assist with core control in self care, mobility, lifting, and ambulation. Therapeutic Activities:    [] (18104 or 15613) Provided verbal/tactile cueing for activities related to improving balance, coordination, kinesthetic sense, posture, motor skill, proprioception and motor activation to allow for proper function  with self care and ADLs  [] (20848) Provided training and instruction to the patient for proper core and proximal hip recruitment and positioning with ambulation re-education     Home Exercise Program:    [x] (26165) Reviewed/Progressed HEP activities related to strengthening, flexibility, endurance, ROM of core, proximal hip and LE for functional self-care, mobility, lifting and ambulation   [] (59050) Reviewed/Progressed HEP activities related to improving balance, coordination, kinesthetic sense, posture, motor skill, proprioception of core, proximal hip and LE for self care, mobility, lifting, and ambulation      Manual Treatments:  PROM / STM / Oscillations-Mobs:  G-I, II, III, IV (PA's, Inf., Post.)  [x] (10811) Provided manual therapy to mobilize proximal hip and LS spine soft tissue/joints for the purpose of modulating pain, promoting relaxation,  increasing ROM, reducing/eliminating soft tissue swelling/inflammation/restriction, improving soft tissue extensibility and allowing for proper ROM for normal function with self care, mobility, lifting and ambulation. Modalities:       Charges:  Timed Code Treatment Minutes: 19   Total Treatment Minutes: 34     [] EVAL (LOW) 49018 (typically 20 minutes face-to-face)  [] EVAL (MOD) 14789 (typically 30 minutes face-to-face)  [] EVAL (HIGH) 69967 (typically 45 minutes face-to-face)  [] RE-EVAL     [] UQ(14903) x  0  [x] DRY NEEDLE 1 OR 2 MUSCLES  [] NMR (28831) x     [] DRY NEEDLE 3+ MUSCLES  [x] Manual (71642) x   1   [] TA (75989) x     [] Mech Traction (15889)  [x] ES(attended) (43636)     [] ES (un) (27337):   [] VASO (93338)  [] Other:     If [facility] Please Indicate Time In/Out  CPT Code Time in Time out                                     GOALS:  Patient stated goal: reduce leg pain   [] Progressing: [x] Met: [] Not Met: [] Adjusted  Therapist goals for Patient:   Short Term Goals: To be achieved in: 2 weeks  1. Independent in HEP and progression per patient tolerance, in order to prevent re-injury. [] Progressing: [x] Met: [] Not Met: [] Adjusted  2. Patient will have a decrease in pain to facilitate improvement in movement, function, and ADLs as indicated by Functional Deficits. [] Progressing: [x] Met: [] Not Met: [] Adjusted    Long Term Goals: To be achieved in: 4 weeks  1. Disability index score of 60% or less for the KB to assist with reaching prior level of function. [x] Progressing: [] Met: [] Not Met: [] Adjusted  2. Patient will demonstrate increased AROM to WNL, good LS mobility, good hip ROM to allow for proper joint functioning as indicated by patients Functional Deficits. [x] Progressing: [] Met: [] Not Met: [] Adjusted  3. Patient will demonstrate an increase in Strength to good proximal hip and core activation to allow for proper functional mobility as indicated by patients Functional Deficits. [x] Progressing: [] Met: [] Not Met: [] Adjusted  4. Patient will return to sitting functional activities without increased symptoms or restriction. [x] Progressing: [] Met: [] Not Met: [] Adjusted  5. Patient will be able to ascend/descend stairs reciprocally without increased symptoms or restriction. (patient specific functional goal)    [] Progressing: [x] Met: [] Not Met: [] Adjusted    ASSESSMENT: Good tolerance to session. Pt noted HS soreness decreased following manual and DN. Denies any pain in hamstring with walking at conclusion.      Treatment/Activity Tolerance:  [x] Patient tolerated treatment well [] Patient limited by fatique  [] Patient limited by pain  [] Patient limited by other medical complications  [] Other: Overall Progression Towards Functional goals/ Treatment Progress Update:  [x] Patient is progressing as expected towards functional goals listed. [] Progression is slowed due to complexities/Impairments listed. [] Progression has been slowed due to co-morbidities. [] Plan just implemented, too soon to assess goals progression <30days   [] Goals require adjustment due to lack of progress  [] Patient is not progressing as expected and requires additional follow up with physician  [] Other:    Prognosis for POC: [x] Good [] Fair  [] Poor    Patient requires continued skilled intervention: [x] Yes  [] No        PLAN: 2x/wk for 4-6 wks for core and BLE strengthening  [x] Continue per plan of care [] Alter current plan (see comments)  [] Plan of care initiated [] Hold pending MD visit [] Discharge    Electronically signed by: Idania Hardy PT    Note: If patient does not return for scheduled/recommended follow up visits, this note will serve as a discharge from care along with the most recent update on progress.

## 2020-11-30 ENCOUNTER — APPOINTMENT (OUTPATIENT)
Dept: PHYSICAL THERAPY | Age: 75
End: 2020-11-30
Payer: MEDICARE

## 2020-12-01 ENCOUNTER — HOSPITAL ENCOUNTER (OUTPATIENT)
Dept: PHYSICAL THERAPY | Age: 75
Setting detail: THERAPIES SERIES
Discharge: HOME | End: 2020-12-01
Payer: MEDICARE

## 2020-12-01 PROCEDURE — 97140 MANUAL THERAPY 1/> REGIONS: CPT

## 2020-12-01 NOTE — FLOWSHEET NOTE
opening   PROM  1 0 Prone IR/ER   LAD 1 4 L   Hip LA distraction 1 4    STM/IASTM 1 10 L medial HS, adductors         NMR re-education (38444)   Min:      Mf Activation- re-ed      TrA Re-ed activation      Glute Max re-ed activation      Prone ira Rodgers            Therapeutic Activity (05667) Min:                        Therapeutic Exercise and NMR EXR  [x] (02608) Provided verbal/tactile cueing for activities related to strengthening, flexibility, endurance, ROM  for improvements in proximal hip and core control with self care, mobility, lifting and ambulation. [x] (91154) Provided verbal/tactile cueing for activities related to improving balance, coordination, kinesthetic sense, posture, motor skill, proprioception  to assist with core control in self care, mobility, lifting, and ambulation.      Therapeutic Activities:    [] (45104 or 94610) Provided verbal/tactile cueing for activities related to improving balance, coordination, kinesthetic sense, posture, motor skill, proprioception and motor activation to allow for proper function  with self care and ADLs  [] (62868) Provided training and instruction to the patient for proper core and proximal hip recruitment and positioning with ambulation re-education     Home Exercise Program:    [x] (77897) Reviewed/Progressed HEP activities related to strengthening, flexibility, endurance, ROM of core, proximal hip and LE for functional self-care, mobility, lifting and ambulation   [] (43254) Reviewed/Progressed HEP activities related to improving balance, coordination, kinesthetic sense, posture, motor skill, proprioception of core, proximal hip and LE for self care, mobility, lifting, and ambulation      Manual Treatments:  PROM / STM / Oscillations-Mobs:  G-I, II, III, IV (PA's, Inf., Post.)  [x] (73590) Provided manual therapy to mobilize proximal hip and LS spine soft tissue/joints for the purpose of modulating pain, promoting relaxation,  increasing ROM, reducing/eliminating soft tissue swelling/inflammation/restriction, improving soft tissue extensibility and allowing for proper ROM for normal function with self care, mobility, lifting and ambulation. Modalities:       Charges:  Timed Code Treatment Minutes: 36   Total Treatment Minutes: 36     [] EVAL (LOW) 93683 (typically 20 minutes face-to-face)  [] EVAL (MOD) 34026 (typically 30 minutes face-to-face)  [] EVAL (HIGH) 74516 (typically 45 minutes face-to-face)  [] RE-EVAL     [] XL(11388) x  0  [] DRY NEEDLE 1 OR 2 MUSCLES  [] NMR (95065) x     [] DRY NEEDLE 3+ MUSCLES  [x] Manual (29033) x   2   [] TA (21802) x     [] Mech Traction (99512)  [] ES(attended) (70919)     [] ES (un) (98566):   [] VASO (56393)  [] Other:    If BWC Please Indicate Time In/Out  CPT Code Time in Time out                                     GOALS:  Patient stated goal: reduce leg pain   [] Progressing: [x] Met: [] Not Met: [] Adjusted  Therapist goals for Patient:   Short Term Goals: To be achieved in: 2 weeks  1. Independent in HEP and progression per patient tolerance, in order to prevent re-injury. [] Progressing: [x] Met: [] Not Met: [] Adjusted  2. Patient will have a decrease in pain to facilitate improvement in movement, function, and ADLs as indicated by Functional Deficits. [] Progressing: [x] Met: [] Not Met: [] Adjusted    Long Term Goals: To be achieved in: 4 weeks  1. Disability index score of 60% or less for the KB to assist with reaching prior level of function. [x] Progressing: [] Met: [] Not Met: [] Adjusted  2. Patient will demonstrate increased AROM to WNL, good LS mobility, good hip ROM to allow for proper joint functioning as indicated by patients Functional Deficits. [x] Progressing: [] Met: [] Not Met: [] Adjusted  3. Patient will demonstrate an increase in Strength to good proximal hip and core activation to allow for proper functional mobility as indicated by patients Functional Deficits.    [x] Progressing: [] Met: [] Not Met: [] Adjusted  4. Patient will return to sitting functional activities without increased symptoms or restriction. [x] Progressing: [] Met: [] Not Met: [] Adjusted  5. Patient will be able to ascend/descend stairs reciprocally without increased symptoms or restriction. (patient specific functional goal)    [] Progressing: [x] Met: [] Not Met: [] Adjusted    ASSESSMENT: Good tolerance to session. Pt noted with increased adductor restriction today; not as much restriction in HS compared to prior session. Tender along medial jt line of L knee. Increased SI restriction on L with decreased overall mobility. Patient reports less discomfort in leg at conclusion. Mild discomfort in SI area which improved with LAD. Continue as indicated. Treatment/Activity Tolerance:  [x] Patient tolerated treatment well [] Patient limited by fatique  [] Patient limited by pain  [] Patient limited by other medical complications  [] Other:     Overall Progression Towards Functional goals/ Treatment Progress Update:  [x] Patient is progressing as expected towards functional goals listed. [] Progression is slowed due to complexities/Impairments listed. [] Progression has been slowed due to co-morbidities.   [] Plan just implemented, too soon to assess goals progression <30days   [] Goals require adjustment due to lack of progress  [] Patient is not progressing as expected and requires additional follow up with physician  [] Other:    Prognosis for POC: [x] Good [] Fair  [] Poor    Patient requires continued skilled intervention: [x] Yes  [] No        PLAN: 2x/wk for 4-6 wks for core and BLE strengthening  [x] Continue per plan of care [] Alter current plan (see comments)  [] Plan of care initiated [] Hold pending MD visit [] Discharge    Electronically signed by: Sol Meyers PT    Note: If patient does not return for scheduled/recommended follow up visits, this note will serve as a discharge from care along with the most recent update on progress.

## 2020-12-01 NOTE — FLOWSHEET NOTE
[facility]    Physical Therapy  Cancellation/No-show Note  Patient Name:  Toshia Meeks  :  1945   Date:  2020  Cancelled visits to date: 2  No-shows to date: 0    For today's appointment patient:  [x]  Cancelled  []  Rescheduled appointment  []  No-show     Reason given by patient:  []  Patient ill  []  Conflicting appointment  []  No transportation    []  Conflict with work  []  No reason given  [x]  Other:     Comments:  Patient spouse called an reported that patient is having a hard time moving around today- rescheduled for another appt.       Electronically signed by:  Devyn Llanes PT

## 2020-12-03 ENCOUNTER — HOSPITAL ENCOUNTER (OUTPATIENT)
Dept: PHYSICAL THERAPY | Age: 75
Setting detail: THERAPIES SERIES
Discharge: HOME | End: 2020-12-03
Payer: MEDICARE

## 2020-12-03 PROCEDURE — 97140 MANUAL THERAPY 1/> REGIONS: CPT

## 2020-12-03 NOTE — FLOWSHEET NOTE
[facility]  Phone: [phone]   Fax:     [phone]      Date:  12/3/2020    Patient Name:  April Stevenson    :  1945  MRN: [id number]  Restrictions/Precautions:    Medical/Treatment Diagnosis Information:  · Diagnosis: L knee pain, lumbar radiculopathy  · Treatment Diagnosis: L knee pain M25.562, LBP E07.2  Insurance/Certification information:  PT Insurance Information: Medicare/Medical [place]  Physician Information:  Referring Practitioner: Sebastian Worrell of care signed (Y/N):     Date of Patient follow up with Physician:      Progress Report: []  Yes  [x]  No     Date Range for reporting period:  Beginning: 10/8/20  Ending:     Progress report due (10 Rx/or 30 days whichever is less):     Recertification due (POC duration/ or 90 days whichever is less):2020     Visit # Insurance Allowable Auth Needed   17 Medicare/Med [place] []Yes    [x]No     Pain level:  4/10   Functional Scale: LEFS 61.25% disability   Date Assessed: 10/6/2020    SUBJECTIVE:  Patient reports that she isn't feeling as much in her L leg as she normally does. Feeling a little sore in her low back today.      OBJECTIVE:    Observation: antalgic gait, increased QL restriction on R   Test measurements:        RESTRICTIONS/PRECAUTIONS: none    Exercises/Interventions:     Therapeutic Ex (74865)   Min: 0 min sets/sec reps notes         Bridge  3-5sec 20    Kneeling Alt Arm-Leg         Supine nerve gliding/hamstring Side planks      Kneeling hip abd/ext      1/2 kneeling down chop      Std band pull down      Lateral band walk      Bosu Lunge      Hip Flex stretch      Supine march 2 10       Manual Intervention (59419) Min: 36 min      Knee extension mobs 1 4    Prone PA 1 5 bilat L/s and SI jt   STM 1 5 Lumbar paraspinals   Lumbar Manip 1 4 SL- L opening   PROM  1 0 Prone IR/ER   LAD 1 4 L         STM/IASTM 1 10 L medial extensibility and allowing for proper ROM for normal function with self care, mobility, lifting and ambulation. Modalities:       Charges:  Timed Code Treatment Minutes: 36   Total Treatment Minutes: 36     [] EVAL (LOW) 08001 (typically 20 minutes face-to-face)  [] EVAL (MOD) 44735 (typically 30 minutes face-to-face)  [] EVAL (HIGH) 14553 (typically 45 minutes face-to-face)  [] RE-EVAL     [] NK(16374) x  0  [] DRY NEEDLE 1 OR 2 MUSCLES  [] NMR (92222) x     [] DRY NEEDLE 3+ MUSCLES  [x] Manual (84326) x   2   [] TA (95595) x     [] Mech Traction (45997)  [] ES(attended) (93355)     [] ES (un) (44420):   [] VASO (58304)  [] Other:    If BWC Please Indicate Time In/Out  CPT Code Time in Time out                                     GOALS:  Patient stated goal: reduce leg pain   [] Progressing: [x] Met: [] Not Met: [] Adjusted  Therapist goals for Patient:   Short Term Goals: To be achieved in: 2 weeks  1. Independent in HEP and progression per patient tolerance, in order to prevent re-injury. [] Progressing: [x] Met: [] Not Met: [] Adjusted  2. Patient will have a decrease in pain to facilitate improvement in movement, function, and ADLs as indicated by Functional Deficits. [] Progressing: [x] Met: [] Not Met: [] Adjusted    Long Term Goals: To be achieved in: 4 weeks  1. Disability index score of 60% or less for the KB to assist with reaching prior level of function. [x] Progressing: [] Met: [] Not Met: [] Adjusted  2. Patient will demonstrate increased AROM to WNL, good LS mobility, good hip ROM to allow for proper joint functioning as indicated by patients Functional Deficits. [x] Progressing: [] Met: [] Not Met: [] Adjusted  3. Patient will demonstrate an increase in Strength to good proximal hip and core activation to allow for proper functional mobility as indicated by patients Functional Deficits. [x] Progressing: [] Met: [] Not Met: [] Adjusted  4.  Patient will return to sitting functional activities without increased symptoms or restriction. [x] Progressing: [] Met: [] Not Met: [] Adjusted  5. Patient will be able to ascend/descend stairs reciprocally without increased symptoms or restriction. (patient specific functional goal)    [] Progressing: [x] Met: [] Not Met: [] Adjusted    ASSESSMENT:  Patient with less restriction in L leg today- feeling improvement throughout adductor and hamstring. Patient with good improvement in lumbar discomfort with mobilization. Patient noted with increased soft tissue mass along L SI joint. Discussed with patient to follow up with PCP regarding this and possible further imaging to ensure not contributing to issues. Patient reporting improved discomfort at conclusion. Continue as indicated. Treatment/Activity Tolerance:   [x] Patient tolerated treatment well [] Patient limited by fatique  [] Patient limited by pain  [] Patient limited by other medical complications  [] Other:     Overall Progression Towards Functional goals/ Treatment Progress Update:  [x] Patient is progressing as expected towards functional goals listed. [] Progression is slowed due to complexities/Impairments listed. [] Progression has been slowed due to co-morbidities.   [] Plan just implemented, too soon to assess goals progression <30days   [] Goals require adjustment due to lack of progress  [] Patient is not progressing as expected and requires additional follow up with physician  [] Other:    Prognosis for POC: [x] Good [] Fair  [] Poor    Patient requires continued skilled intervention: [x] Yes  [] No        PLAN: 2x/wk for 4-6 wks for core and BLE strengthening  [x] Continue per plan of care [] Alter current plan (see comments)  [] Plan of care initiated [] Hold pending MD visit [] Discharge    Electronically signed by: Jumana eMndes PT    Note: If patient does not return for scheduled/recommended follow up visits, this note will serve as a discharge from care along with the most recent update on progress.

## 2020-12-08 ENCOUNTER — APPOINTMENT (OUTPATIENT)
Dept: PHYSICAL THERAPY | Age: 75
End: 2020-12-08
Payer: MEDICARE

## 2020-12-10 ENCOUNTER — HOSPITAL ENCOUNTER (OUTPATIENT)
Dept: PHYSICAL THERAPY | Age: 75
Setting detail: THERAPIES SERIES
Discharge: HOME | End: 2020-12-10
Payer: MEDICARE

## 2020-12-10 PROCEDURE — 97140 MANUAL THERAPY 1/> REGIONS: CPT

## 2020-12-10 NOTE — FLOWSHEET NOTE
[address] [place] [address]  Phone: [phone]   Fax:     [phone]      Date:  12/10/2020    Patient Name:  Kori Ramesh    :  1945  MRN: [id number]  Restrictions/Precautions:    Medical/Treatment Diagnosis Information:  · Diagnosis: L knee pain, lumbar radiculopathy  · Treatment Diagnosis: L knee pain M25.562, LBP R86.9  Insurance/Certification information:  PT Insurance Information: Medicare/Medical [facility]  Physician Information:  Referring Practitioner: Bassam Judge of care signed (Y/N):     Date of Patient follow up with Physician:      Progress Report: []  Yes  [x]  No     Date Range for reporting period:  Beginning: 10/8/20  Ending:     Progress report due (10 Rx/or 30 days whichever is less):     Recertification due (POC duration/ or 90 days whichever is less):2020     Visit # Insurance Allowable Auth Needed   18 Medicare/Med [facility] []Yes    [x]No     Pain level:  10   Functional Scale: LEFS 61.25% disability   Date Assessed: 10/6/2020    SUBJECTIVE:  Patient reports that she isn't feeling as much in her L leg as she normally does. Feeling a little sore in her low back today.      OBJECTIVE:    Observation: antalgic gait, increased QL restriction on R   Test measurements:        RESTRICTIONS/PRECAUTIONS: none    Exercises/Interventions:     Therapeutic Ex (02363)   Min: 0 min sets/sec reps notes         Bridge  3-5sec 20    Kneeling Alt Arm-Leg         Supine nerve gliding/hamstring Side planks      Kneeling hip abd/ext      1/2 kneeling down chop      Std band pull down      Lateral band walk      Bosu Lunge      Hip Flex stretch      Supine march 2 10       Manual Intervention (69760) Min: 36 min      Knee extension mobs 1 0    Prone PA 1 5 bilat L/s and SI jt   STM 1 5 Lumbar paraspinals   Lumbar Manip 1 4 SL- L opening   PROM  1 4 Prone IR/ER   LAD 1 4 L         STM/IASTM 1 15 L medial HS, adductors         NMR re-education (12614)   Min:      Mf Activation- re-ed      TrA Re-ed activation      Glute Max re-ed activation      Prone froggers      [place]            Therapeutic Activity (92568) Min:                        Therapeutic Exercise and NMR EXR  [x] (20484) Provided verbal/tactile cueing for activities related to strengthening, flexibility, endurance, ROM  for improvements in proximal hip and core control with self care, mobility, lifting and ambulation. [x] (83469) Provided verbal/tactile cueing for activities related to improving balance, coordination, kinesthetic sense, posture, motor skill, proprioception  to assist with core control in self care, mobility, lifting, and ambulation.      Therapeutic Activities:    [] (71770 or 19050) Provided verbal/tactile cueing for activities related to improving balance, coordination, kinesthetic sense, posture, motor skill, proprioception and motor activation to allow for proper function  with self care and ADLs  [] (84208) Provided training and instruction to the patient for proper core and proximal hip recruitment and positioning with ambulation re-education     Home Exercise Program:    [x] (48675) Reviewed/Progressed HEP activities related to strengthening, flexibility, endurance, ROM of core, proximal hip and LE for functional self-care, mobility, lifting and ambulation   [] (13342) Reviewed/Progressed HEP activities related to improving balance, coordination, kinesthetic sense, posture, motor skill, proprioception of core, proximal hip and LE for self care, mobility, lifting, and ambulation      Manual Treatments:  PROM / STM / Oscillations-Mobs:  G-I, II, III, IV (PA's, Inf., Post.)  [x] (76669) Provided manual therapy to mobilize proximal hip and LS spine soft tissue/joints for the purpose of modulating pain, promoting relaxation,  increasing ROM, reducing/eliminating soft tissue swelling/inflammation/restriction, improving soft tissue extensibility and allowing for proper ROM for normal function with self care, mobility, lifting and ambulation. Modalities:       Charges:  Timed Code Treatment Minutes: 36   Total Treatment Minutes: 36     [] EVAL (LOW) 93612 (typically 20 minutes face-to-face)  [] EVAL (MOD) 47762 (typically 30 minutes face-to-face)  [] EVAL (HIGH) 87530 (typically 45 minutes face-to-face)  [] RE-EVAL     [] HG(76964) x  0  [] DRY NEEDLE 1 OR 2 MUSCLES  [] NMR (38135) x     [] DRY NEEDLE 3+ MUSCLES  [x] Manual (81404) x   2   [] TA (29614) x     [] Mech Traction (05697)  [] ES(attended) (72149)     [] ES (un) (03139):   [] VASO (40089)  [] Other:    If BWC Please Indicate Time In/Out  CPT Code Time in Time out                                     GOALS:  Patient stated goal: reduce leg pain   [] Progressing: [x] Met: [] Not Met: [] Adjusted  Therapist goals for Patient:   Short Term Goals: To be achieved in: 2 weeks  1. Independent in HEP and progression per patient tolerance, in order to prevent re-injury. [] Progressing: [x] Met: [] Not Met: [] Adjusted  2. Patient will have a decrease in pain to facilitate improvement in movement, function, and ADLs as indicated by Functional Deficits. [] Progressing: [x] Met: [] Not Met: [] Adjusted    Long Term Goals: To be achieved in: 4 weeks  1. Disability index score of 60% or less for the KB to assist with reaching prior level of function. [x] Progressing: [] Met: [] Not Met: [] Adjusted  2. Patient will demonstrate increased AROM to WNL, good LS mobility, good hip ROM to allow for proper joint functioning as indicated by patients Functional Deficits. [x] Progressing: [] Met: [] Not Met: [] Adjusted  3. Patient will demonstrate an increase in Strength to good proximal hip and core activation to allow for proper functional mobility as indicated by patients Functional Deficits. [x] Progressing: [] Met: [] Not Met: [] Adjusted  4.  Patient will return to sitting functional

## 2020-12-15 ENCOUNTER — HOSPITAL ENCOUNTER (OUTPATIENT)
Dept: PHYSICAL THERAPY | Age: 75
Setting detail: THERAPIES SERIES
Discharge: HOME | End: 2020-12-15
Payer: MEDICARE

## 2020-12-15 NOTE — FLOWSHEET NOTE
[facility]    Physical Therapy  Cancellation/No-show Note  Patient Name:  Candice Valladares  :  1945   Date:  12/15/2020  Cancelled visits to date: 1  No-shows to date: 0    For today's appointment patient:  [x]  Cancelled  []  Rescheduled appointment  []  No-show     Reason given by patient:  []  Patient ill  []  Conflicting appointment  []  No transportation    []  Conflict with work  []  No reason given  [x]  Other:     Comments:  Power outage in clinic    Electronically signed by:  Dillon Pham, PT

## 2020-12-17 ENCOUNTER — HOSPITAL ENCOUNTER (OUTPATIENT)
Dept: PHYSICAL THERAPY | Age: 75
Setting detail: THERAPIES SERIES
Discharge: HOME | End: 2020-12-17
Payer: MEDICARE

## 2020-12-17 PROCEDURE — 97140 MANUAL THERAPY 1/> REGIONS: CPT

## 2020-12-17 NOTE — FLOWSHEET NOTE
[address] [place] [address]  Phone: [phone]   Fax:     [phone]      Date:  2020    Patient Name:  Kizzy Brewer    :  1945  MRN: [id number]  Restrictions/Precautions:    Medical/Treatment Diagnosis Information:  · Diagnosis: L knee pain, lumbar radiculopathy  · Treatment Diagnosis: L knee pain M25.562, LBP Z91.8  Insurance/Certification information:  PT Insurance Information: Medicare/Medical [place]  Physician Information:  Referring Practitioner: Bailey Lisa of care signed (Y/N):     Date of Patient follow up with Physician:      Progress Report: []  Yes  [x]  No     Date Range for reporting period:  Beginning: 10/8/20  Ending:     Progress report due (10 Rx/or 30 days whichever is less): 83    Recertification due (POC duration/ or 90 days whichever is less):2020     Visit # Insurance Allowable Auth Needed   19 Medicare/Med [place] []Yes    [x]No     Pain level:  1/10   Functional Scale: LEFS 61.25% disability   Date Assessed: 10/6/2020    SUBJECTIVE:  Patient reports that she is having more discomfort today in her L low back, L glute and L leg today. Has not been to the chiro in the past 1.5 weeks. OBJECTIVE:   ? Observation: antalgic gait, increased QL restriction on R  ?  Test measurements:        RESTRICTIONS/PRECAUTIONS: none    Exercises/Interventions:     Therapeutic Ex (57696)   Min: 0 min sets/sec reps notes         Bridge  3-5sec 20    Kneeling Alt Arm-Leg         Supine nerve gliding/hamstring Side planks      Kneeling hip abd/ext      1/2 kneeling down chop      Std band pull down      Lateral band walk      Bosu Lunge      Hip Flex stretch      Supine marches 2 10       Manual Intervention (09027) Min: 36 min      Knee extension mobs 1 0    Prone PA 1 5 bilat L/s and SI jt   STM 1 5 Lumbar paraspinals   Lumbar Manip 1 4 SL- L opening   PROM  1 4 Prone IR/ER   LAD 1 4 L Manual hamstring/adductor stretch 30 4    STM/IASTM 1 10 L medial HS, adductors         NMR re-education (05498)   Min:      Mf Activation- re-ed      TrA Re-ed activation      Glute Max re-ed activation      Prone ira Rodgers            Therapeutic Activity (88927) Min:                        Therapeutic Exercise and NMR EXR  [x] (64141) Provided verbal/tactile cueing for activities related to strengthening, flexibility, endurance, ROM  for improvements in proximal hip and core control with self care, mobility, lifting and ambulation. [x] (16170) Provided verbal/tactile cueing for activities related to improving balance, coordination, kinesthetic sense, posture, motor skill, proprioception  to assist with core control in self care, mobility, lifting, and ambulation.      Therapeutic Activities:    [] (69431 or 29909) Provided verbal/tactile cueing for activities related to improving balance, coordination, kinesthetic sense, posture, motor skill, proprioception and motor activation to allow for proper function  with self care and ADLs  [] (33709) Provided training and instruction to the patient for proper core and proximal hip recruitment and positioning with ambulation re-education     Home Exercise Program:    [x] (76493) Reviewed/Progressed HEP activities related to strengthening, flexibility, endurance, ROM of core, proximal hip and LE for functional self-care, mobility, lifting and ambulation   [] (08853) Reviewed/Progressed HEP activities related to improving balance, coordination, kinesthetic sense, posture, motor skill, proprioception of core, proximal hip and LE for self care, mobility, lifting, and ambulation      Manual Treatments:  PROM / STM / Oscillations-Mobs:  G-I, II, III, IV (PA's, Inf., Post.) [x] Continue per plan of care [] Alter current plan (see comments)  [] Plan of care initiated [] Hold pending MD visit [] Discharge    Electronically signed by: Stacy Valadez PT    Note: If patient does not return for scheduled/recommended follow up visits, this note will serve as a discharge from care along with the most recent update on progress.

## 2020-12-22 ENCOUNTER — HOSPITAL ENCOUNTER (OUTPATIENT)
Dept: PHYSICAL THERAPY | Age: 75
Setting detail: THERAPIES SERIES
Discharge: HOME | End: 2020-12-22
Payer: MEDICARE

## 2020-12-22 PROCEDURE — 97110 THERAPEUTIC EXERCISES: CPT

## 2020-12-22 PROCEDURE — 97140 MANUAL THERAPY 1/> REGIONS: CPT

## 2020-12-22 NOTE — PLAN OF CARE
[facility] Olivia Arizmendi  Phone: [phone]   Fax:     [phone]        Physical Therapy Re-Certification Plan of Care/MD UPDATE      Dear Referring Practitioner: Ryan Rodriguez,    We had the pleasure of treating the following patient for physical therapy services at [address] [place] [address]. A summary of our findings can be found in the updated assessment below. This includes our plan of care. If you have any questions or concerns regarding these findings, please do not hesitate to contact me at the office phone number checked above. Thank you for the referral.     Physician Signature:________________________________Date:__________________  By signing above (or electronic signature), therapists plan is approved by physician    Date Range Of Visits: 2020 thru 2020  Total Visits to Date: 21  Overall Response to Treatment:   []Patient is responding well to treatment and improvement is noted with regards  to goals   []Patient should continue to improve in reasonable time if they continue HEP   [x]Patient has plateaued and is no longer responding to skilled PT intervention    []Patient is getting worse and would benefit from return to referring MD   []Patient unable to adhere to initial POC   [x]Other: Patient has not been making much progress in past 3-4 visits. Patient has seen Dr Shabana Herron and has been recommended to have injection; however patient has been fearful of having done due to procedure needing to be done in hospital. Patient waiting until new facility in place prior to having injection done. Will plan to hold on therapy at this time since no significant progress has been made recently, until patient has injection and will then revisit need for PT.        Date:  2020    Patient Name:  Valentino Engel    :  [age]  MRN: [id number]  Restrictions/Precautions: Medical/Treatment Diagnosis Information:  · Diagnosis: L knee pain, lumbar radiculopathy  · Treatment Diagnosis: L knee pain M25.562, LBP X04.7  Insurance/Certification information:  PT Insurance Information: Medicare/Medical [place]  Physician Information:  Referring Practitioner: Kelly Leader of care signed (Y/N):     Date of Patient follow up with Physician:      Progress Report: []  Yes  [x]  No     Date Range for reporting period:  Beginning: 10/8/20  Endin2020    Progress report due (10 Rx/or 30 days whichever is less):     Recertification due (POC duration/ or 90 days whichever is less):     Visit # Insurance Allowable Auth Needed   20 Medicare/Med [place] []Yes    [x]No     Pain level:  2-3/10   Functional Scale: LEFS 57% disability   Date Assessed: 2020    SUBJECTIVE:  Patient reports that she is irritated her L foot getting out of bed this morning, so put an air cast on it to assist with her walking. Notes that she feels good having things loosened up with therapy; but doesn't last. At times, she has sharp searing pain in R buttock from what she believes is \"piriformis syndrome\"; however still has discomfort along inside of L knee. Not as much soreness along glutes and mid hamstring though. OBJECTIVE:   ? Observation: antalgic gait, increased QL restriction on R  ?  Test measurements:      ROM LEFT RIGHT Comments   Lumbar Side Bend         Lumbar Rotation         Quadrant         Hip Flexion 115 115     Hip Abd         Hip ER 55 55     Hip IR 35 35     Hip Extension             RESTRICTIONS/PRECAUTIONS: none    Exercises/Interventions:     Therapeutic Ex (34466)   Min: 12 min sets/sec reps notes         Bridge  3-5sec 20    Clamshell 10 10    SL hip abduction 1 10    Kneeling Alt Arm-Leg         Supine nerve gliding/hamstring Side planks      Kneeling hip abd/ext      1/2 kneeling down chop      Std band pull down      Lateral band walk      Bosu Lunge      Hip Flex stretch Supine marches 2 10       Manual Intervention (35135) Min: 27 min      Knee extension mobs 1 0    Prone PA 1 8 bilat L/s and SI jt   STM 1 0 Lumbar paraspinals   Lumbar Manip 1 0 SL- L opening   PROM  1 0 Prone IR/ER   LAD 1 4 bilat   Manual hamstring/adductor stretch 0 0    STM/IASTM 1 15 L medial HS, adductors and R glute med/min/max         NMR re-education (65401)   Min:      Mf Activation- re-ed      TrA Re-ed activation      Glute Max re-ed activation      Prone ira Rodgers            Therapeutic Activity (14165) Min:                        Therapeutic Exercise and NMR EXR  [x] (73576) Provided verbal/tactile cueing for activities related to strengthening, flexibility, endurance, ROM  for improvements in proximal hip and core control with self care, mobility, lifting and ambulation. [x] (44018) Provided verbal/tactile cueing for activities related to improving balance, coordination, kinesthetic sense, posture, motor skill, proprioception  to assist with core control in self care, mobility, lifting, and ambulation.      Therapeutic Activities:    [] (97077 or 17180) Provided verbal/tactile cueing for activities related to improving balance, coordination, kinesthetic sense, posture, motor skill, proprioception and motor activation to allow for proper function  with self care and ADLs  [] (48550) Provided training and instruction to the patient for proper core and proximal hip recruitment and positioning with ambulation re-education     Home Exercise Program:    [x] (17468) Reviewed/Progressed HEP activities related to strengthening, flexibility, endurance, ROM of core, proximal hip and LE for functional self-care, mobility, lifting and ambulation   [] (29090) Reviewed/Progressed HEP activities related to improving balance, coordination, kinesthetic sense, posture, motor skill, proprioception of core, proximal hip and LE for self care, mobility, lifting, and ambulation Manual Treatments:  PROM / STM / Oscillations-Mobs:  G-I, II, III, IV (PA's, Inf., Post.)  [x] (05835) Provided manual therapy to mobilize proximal hip and LS spine soft tissue/joints for the purpose of modulating pain, promoting relaxation,  increasing ROM, reducing/eliminating soft tissue swelling/inflammation/restriction, improving soft tissue extensibility and allowing for proper ROM for normal function with self care, mobility, lifting and ambulation. Modalities:       Charges:  Timed Code Treatment Minutes: 39   Total Treatment Minutes: 39     [] EVAL (LOW) 41344 (typically 20 minutes face-to-face)  [] EVAL (MOD) 71405 (typically 30 minutes face-to-face)  [] EVAL (HIGH) 94448 (typically 45 minutes face-to-face)  [] RE-EVAL     [x] NQ(56567) x  1  [] DRY NEEDLE 1 OR 2 MUSCLES  [] NMR (75858) x     [] DRY NEEDLE 3+ MUSCLES  [x] Manual (51872) x   2   [] TA (46932) x     [] Mech Traction (15180)  [] ES(attended) (96437)     [] ES (un) (14764):   [] VASO (27274)  [] Other:    If [facility] Please Indicate Time In/Out  CPT Code Time in Time out                                     GOALS:  Patient stated goal: reduce leg pain   [] Progressing: [x] Met: [] Not Met: [] Adjusted  Therapist goals for Patient:   Short Term Goals: To be achieved in: 2 weeks  1. Independent in HEP and progression per patient tolerance, in order to prevent re-injury. [] Progressing: [x] Met: [] Not Met: [] Adjusted  2. Patient will have a decrease in pain to facilitate improvement in movement, function, and ADLs as indicated by Functional Deficits. [] Progressing: [x] Met: [] Not Met: [] Adjusted    Long Term Goals: To be achieved in: 4 weeks  1. Disability index score of 60% or less for the KB to assist with reaching prior level of function.    [] Progressing: [x] Met: [] Not Met: [] Adjusted [x] Patient is progressing as expected towards functional goals listed. [] Progression is slowed due to complexities/Impairments listed. [] Progression has been slowed due to co-morbidities. [] Plan just implemented, too soon to assess goals progression <30days   [] Goals require adjustment due to lack of progress  [] Patient is not progressing as expected and requires additional follow up with physician  [] Other:    Prognosis for POC: [x] Good [] Fair  [] Poor    Patient requires continued skilled intervention: [x] Yes  [] No        PLAN: 2x/wk for 4-6 wks for core and BLE strengthening  [x] Continue per plan of care [] Alter current plan (see comments)  [] Plan of care initiated [] Hold pending MD visit [] Discharge    Electronically signed by: Jose Hood PT    Note: If patient does not return for scheduled/recommended follow up visits, this note will serve as a discharge from care along with the most recent update on progress.

## 2020-12-29 ENCOUNTER — HOSPITAL ENCOUNTER (OUTPATIENT)
Dept: PHYSICAL THERAPY | Age: 75
Setting detail: THERAPIES SERIES
Discharge: HOME | End: 2020-12-29
Payer: MEDICARE

## 2020-12-29 PROCEDURE — 97110 THERAPEUTIC EXERCISES: CPT

## 2020-12-29 PROCEDURE — 97140 MANUAL THERAPY 1/> REGIONS: CPT

## 2020-12-29 NOTE — FLOWSHEET NOTE
[address] [place] [address]  Phone: [phone]   Fax:     [phone]      Physical Therapy Treatment Note/Progress Report        Date:  2020    Patient Name:  Jean Watson    :  1945  MRN: [id number]  Restrictions/Precautions:    Medical/Treatment Diagnosis Information:  · Diagnosis: L knee pain, lumbar radiculopathy  · Treatment Diagnosis: L knee pain M25.562, LBP M37.2  Insurance/Certification information:  PT Insurance Information: Medicare/Medical [place]  Physician Information:  Referring Practitioner: Che Chavarria of care signed (Y/N):     Date of Patient follow up with Physician:      Progress Report: []  Yes  [x]  No     Date Range for reporting period:  Beginning: 10/8/20  Endin2020    Progress report due (10 Rx/or 30 days whichever is less):     Recertification due (POC duration/ or 90 days whichever is less):     Visit # Insurance Allowable Auth Needed   21 Medicare/Med [place] []Yes    [x]No     Pain level:  2-3/10   Functional Scale: LEFS 57% disability   Date Assessed: 2020    SUBJECTIVE:  Patient reports that she did her home exercises this morning. She doesn't usually do her exercises on a day that she comes to therapy but she did today. Pt plans to contact Dr. Bryanna Gonzales office to schedule an appointment to see if injection would be the best course to address her pain at this point. Not much improvement with PT though Pt states that she has enjoyed her experience here. OBJECTIVE:   ? Observation: antalgic gait, increased QL restriction on R  ?  Test measurements:      ROM LEFT RIGHT Comments   Lumbar Side Bend         Lumbar Rotation         Quadrant         Hip Flexion 115 115     Hip Abd         Hip ER 55 55     Hip IR 35 35     Hip Extension             RESTRICTIONS/PRECAUTIONS: none    Exercises/Interventions:     Therapeutic Ex (66581)   Min: 14 min sets/sec reps notes Hamstring and hip adductor stretch 30 4 L hip      Bridge  3-5sec 20    L hip fallouts for stretching after STM 10sec 5    Clamshell 10 10 L only (bilat at home)   SL hip abduction 1 10 challenging   Kneeling Alt Arm-Leg         Supine nerve gliding/hamstring Side planks      Kneeling hip abd/ext      1/2 kneeling down chop      Std band pull down      Lateral band walk      Bosu Lunge      Hip Flex stretch      Supine marches 2 10       Manual Intervention (63993) Min: 27 min      Knee extension mobs 1 0    Prone PA 1 8 bilat L/s and SI jt   STM 1 0 Lumbar paraspinals   Lumbar Manip 1 0 SL- L opening   PROM  1 0 Prone IR/ER   LAD 1 4 bilat   Manual hamstring/adductor stretch 0 0    STM/IASTM 1 15 L medial HS, adductors and R glute med/min/max         NMR re-education (87179)   Min:      Mf Activation- re-ed      TrA Re-ed activation      Glute Max re-ed activation      Prone ira Rodgers            Therapeutic Activity (33232) Min:                        Therapeutic Exercise and NMR EXR  [x] (56025) Provided verbal/tactile cueing for activities related to strengthening, flexibility, endurance, ROM  for improvements in proximal hip and core control with self care, mobility, lifting and ambulation. [x] (88686) Provided verbal/tactile cueing for activities related to improving balance, coordination, kinesthetic sense, posture, motor skill, proprioception  to assist with core control in self care, mobility, lifting, and ambulation.      Therapeutic Activities:    [] (88744 or 42623) Provided verbal/tactile cueing for activities related to improving balance, coordination, kinesthetic sense, posture, motor skill, proprioception and motor activation to allow for proper function  with self care and ADLs  [] (39493) Provided training and instruction to the patient for proper core and proximal hip recruitment and positioning with ambulation re-education     Home Exercise Program: [x] (58806) Reviewed/Progressed HEP activities related to strengthening, flexibility, endurance, ROM of core, proximal hip and LE for functional self-care, mobility, lifting and ambulation   [] (97983) Reviewed/Progressed HEP activities related to improving balance, coordination, kinesthetic sense, posture, motor skill, proprioception of core, proximal hip and LE for self care, mobility, lifting, and ambulation      Manual Treatments:  PROM / STM / Oscillations-Mobs:  G-I, II, III, IV (PA's, Inf., Post.)  [x] (80454) Provided manual therapy to mobilize proximal hip and LS spine soft tissue/joints for the purpose of modulating pain, promoting relaxation,  increasing ROM, reducing/eliminating soft tissue swelling/inflammation/restriction, improving soft tissue extensibility and allowing for proper ROM for normal function with self care, mobility, lifting and ambulation. Modalities:       Charges:  Timed Code Treatment Minutes: 41   Total Treatment Minutes: 41     [] EVAL (LOW) 04804 (typically 20 minutes face-to-face)  [] EVAL (MOD) 86007 (typically 30 minutes face-to-face)  [] EVAL (HIGH) 54839 (typically 45 minutes face-to-face)  [] RE-EVAL     [x] CI(27873) x  1  [] DRY NEEDLE 1 OR 2 MUSCLES  [] NMR (53540) x     [] DRY NEEDLE 3+ MUSCLES  [x] Manual (09809) x   2   [] TA (99650) x     [] Mech Traction (99873)  [] ES(attended) (36161)     [] ES (un) (55498):   [] VASO (20041)  [] Other:    If [facility] Please Indicate Time In/Out  CPT Code Time in Time out                                     GOALS:  Patient stated goal: reduce leg pain   [] Progressing: [x] Met: [] Not Met: [] Adjusted  Therapist goals for Patient:   Short Term Goals: To be achieved in: 2 weeks  1. Independent in HEP and progression per patient tolerance, in order to prevent re-injury.    [] Progressing: [x] Met: [] Not Met: [] Adjusted 2. Patient will have a decrease in pain to facilitate improvement in movement, function, and ADLs as indicated by Functional Deficits. [] Progressing: [x] Met: [] Not Met: [] Adjusted    Long Term Goals: To be achieved in: 4 weeks  1. Disability index score of 60% or less for the KB to assist with reaching prior level of function. [] Progressing: [x] Met: [] Not Met: [] Adjusted  2. Patient will demonstrate increased AROM to WNL, good LS mobility, good hip ROM to allow for proper joint functioning as indicated by patients Functional Deficits. [] Progressing: [x] Met: [] Not Met: [] Adjusted  3. Patient will demonstrate an increase in Strength to good proximal hip and core activation to allow for proper functional mobility as indicated by patients Functional Deficits. [x] Progressing: [] Met: [] Not Met: [] Adjusted  4. Patient will return to sitting functional activities without increased symptoms or restriction. [x] Progressing: [] Met: [] Not Met: [] Adjusted  5.  Patient will be able to ascend/descend stairs reciprocally without increased symptoms or restriction. (patient specific functional goal)    [] Progressing: [x] Met: [] Not Met: [] Adjusted ASSESSMENT:  Hip ain unchanged by end of session today. Patient had been making slow improvements in L posterior leg pain; however over past 3-4 visits hasn't had much improvement. Patient is waiting to have injection performed by Dr Haley Arzola. Instructed patient on additional exercises for proximal hip to assist with discomfort noted in R glute med/min. Patient instructed on working soft tissue massage in this area in the meantime and again educated re:  gait pattern of patient- increased L lumbar SB with ambulation which is likely continuing to aggravate L hip and back. Patient to work on home program IND until after injection is performed, unless she has an episode of break through pain that she can't self manage. Otherwise, will plan to have her follow back up with PT after injections. Treatment/Activity Tolerance:   [x] Patient tolerated treatment well [] Patient limited by fatique  [] Patient limited by pain  [] Patient limited by other medical complications  [] Other:     Overall Progression Towards Functional goals/ Treatment Progress Update:  [x] Patient is progressing as expected towards functional goals listed. [] Progression is slowed due to complexities/Impairments listed. [] Progression has been slowed due to co-morbidities.   [] Plan just implemented, too soon to assess goals progression <30days   [] Goals require adjustment due to lack of progress  [] Patient is not progressing as expected and requires additional follow up with physician  [] Other:    Prognosis for POC: [x] Good [] Fair  [] Poor    Patient requires continued skilled intervention: [x] Yes  [] No        PLAN: On hold until Pt gets injection  [x] Continue per plan of care [x] Alter current plan (see comments)   [] Plan of care initiated [] Hold pending MD visit [] Discharge    Electronically signed by: Peggy Burton PTA Note: If patient does not return for scheduled/recommended follow up visits, this note will serve as a discharge from care along with the most recent update on progress.

## 2021-05-05 ENCOUNTER — APPOINTMENT (RX ONLY)
Dept: URBAN - METROPOLITAN AREA CLINIC 170 | Facility: CLINIC | Age: 76
Setting detail: DERMATOLOGY
End: 2021-05-05

## 2021-05-05 DIAGNOSIS — D18.0 HEMANGIOMA: ICD-10-CM | Status: STABLE

## 2021-05-05 DIAGNOSIS — D22 MELANOCYTIC NEVI: ICD-10-CM | Status: STABLE

## 2021-05-05 DIAGNOSIS — L81.4 OTHER MELANIN HYPERPIGMENTATION: ICD-10-CM | Status: STABLE

## 2021-05-05 DIAGNOSIS — L82.1 OTHER SEBORRHEIC KERATOSIS: ICD-10-CM | Status: STABLE

## 2021-05-05 DIAGNOSIS — B07.8 OTHER VIRAL WARTS: ICD-10-CM

## 2021-05-05 DIAGNOSIS — L91.8 OTHER HYPERTROPHIC DISORDERS OF THE SKIN: ICD-10-CM | Status: STABLE

## 2021-05-05 DIAGNOSIS — D36.1 BENIGN NEOPLASM OF PERIPHERAL NERVES AND AUTONOMIC NERVOUS SYSTEM: ICD-10-CM | Status: STABLE

## 2021-05-05 DIAGNOSIS — Z85.828 PERSONAL HISTORY OF OTHER MALIGNANT NEOPLASM OF SKIN: ICD-10-CM | Status: STABLE

## 2021-05-05 PROBLEM — D36.13 BENIGN NEOPLASM OF PERIPHERAL NERVES AND AUTONOMIC NERVOUS SYSTEM OF LOWER LIMB, INCLUDING HIP: Status: ACTIVE | Noted: 2021-05-05

## 2021-05-05 PROBLEM — D22.5 MELANOCYTIC NEVI OF TRUNK: Status: ACTIVE | Noted: 2021-05-05

## 2021-05-05 PROBLEM — D18.01 HEMANGIOMA OF SKIN AND SUBCUTANEOUS TISSUE: Status: ACTIVE | Noted: 2021-05-05

## 2021-05-05 PROCEDURE — ? FULL BODY SKIN EXAM

## 2021-05-05 PROCEDURE — ? SUNSCREEN RECOMMENDATIONS

## 2021-05-05 PROCEDURE — ? LIQUID NITROGEN

## 2021-05-05 PROCEDURE — ? ADDITIONAL NOTES

## 2021-05-05 PROCEDURE — 99213 OFFICE O/P EST LOW 20 MIN: CPT | Mod: 25

## 2021-05-05 PROCEDURE — ? TREATMENT REGIMEN

## 2021-05-05 PROCEDURE — 17110 DESTRUCTION B9 LES UP TO 14: CPT

## 2021-05-05 PROCEDURE — ? COUNSELING

## 2021-05-05 PROCEDURE — ? PRESCRIPTION MEDICATION MANAGEMENT

## 2021-05-05 ASSESSMENT — LOCATION SIMPLE DESCRIPTION DERM
LOCATION SIMPLE: RIGHT ANKLE
LOCATION SIMPLE: RIGHT KNEE
LOCATION SIMPLE: UPPER BACK
LOCATION SIMPLE: LEFT UPPER BACK
LOCATION SIMPLE: ABDOMEN
LOCATION SIMPLE: RIGHT BREAST
LOCATION SIMPLE: RIGHT SHOULDER

## 2021-05-05 ASSESSMENT — LOCATION ZONE DERM
LOCATION ZONE: TRUNK
LOCATION ZONE: LEG
LOCATION ZONE: ARM

## 2021-05-05 ASSESSMENT — LOCATION DETAILED DESCRIPTION DERM
LOCATION DETAILED: PERIUMBILICAL SKIN
LOCATION DETAILED: RIGHT ANKLE
LOCATION DETAILED: RIGHT KNEE
LOCATION DETAILED: RIGHT POSTERIOR SHOULDER
LOCATION DETAILED: RIGHT AXILLARY TAIL OF BREAST
LOCATION DETAILED: SUPERIOR THORACIC SPINE
LOCATION DETAILED: LEFT INFERIOR UPPER BACK
LOCATION DETAILED: RIGHT ANTERIOR SHOULDER

## 2021-05-05 NOTE — PROCEDURE: LIQUID NITROGEN
Add 52 Modifier (Optional): no
Medical Necessity Clause: This procedure was medically necessary because the lesions that were treated were:
Consent: The patient's consent was obtained including but not limited to risks of crusting, scabbing, blistering, scarring, darker or lighter pigmentary change, recurrence, incomplete removal and infection.
Post-Care Instructions: I reviewed with the patient in detail post-instructions. Patient is to wear sunprotection, and avoid picking at any of the treated lesions. Pt may apply Vaseline to crusted or scabbing areas.
Render Post-Care Instructions In Note?: yes
Number Of Freeze-Thaw Cycles: 2 freeze-thaw cycles
Detail Level: Detailed
Medical Necessity Information: It is in your best interest to select a reason for this procedure from the list below. All of these items fulfill various CMS LCD requirements except the new and changing color options.

## 2021-05-05 NOTE — PROCEDURE: PRESCRIPTION MEDICATION MANAGEMENT
Render In Strict Bullet Format?: No
Detail Level: Detailed
Plan: Ok for pt to use OTC Tag Away if not irritating to skin.

## 2021-05-05 NOTE — PROCEDURE: TREATMENT REGIMEN
Otc Regimen: spf 50 daily use
Detail Level: Zone
Initiate Treatment: Hydrocortisone twice a day for itch
Plan: Bx declined d/t increased risk for delayed wound healing from diabetes

## 2021-05-05 NOTE — HPI: EVALUATION OF SKIN LESION(S)
What Type Of Note Output Would You Prefer (Optional)?: Bullet Format
Hpi Title: Evaluation of Skin Lesions
How Severe Are Your Spot(S)?: mild
Have Your Spot(S) Been Treated In The Past?: has not been treated
Additional History: Had a dark line through the right index fingernail, no longer there now.

## 2021-05-05 NOTE — PROCEDURE: MIPS QUALITY
Quality 130: Documentation Of Current Medications In The Medical Record: Current Medications Documented
Quality 431: Preventive Care And Screening: Unhealthy Alcohol Use - Screening: Patient screened for unhealthy alcohol use using a single question and scores less than 2 times per year
Quality 111:Pneumonia Vaccination Status For Older Adults: Pneumococcal Vaccination not Administered or Previously Received, Reason not Otherwise Specified
Detail Level: Detailed
Quality 110: Preventive Care And Screening: Influenza Immunization: Influenza immunization was not ordered or administered, reason not given
Quality 226: Preventive Care And Screening: Tobacco Use: Screening And Cessation Intervention: Patient screened for tobacco use and is an ex/non-smoker
Quality 474: Zoster Vaccination Status: Shingrix Vaccination not Administered or Previously Received, Reason not Otherwise Specified

## 2022-05-24 ENCOUNTER — APPOINTMENT (RX ONLY)
Dept: URBAN - METROPOLITAN AREA CLINIC 170 | Facility: CLINIC | Age: 77
Setting detail: DERMATOLOGY
End: 2022-05-24

## 2022-05-24 DIAGNOSIS — Z85.828 PERSONAL HISTORY OF OTHER MALIGNANT NEOPLASM OF SKIN: ICD-10-CM | Status: STABLE

## 2022-05-24 DIAGNOSIS — L82.0 INFLAMED SEBORRHEIC KERATOSIS: ICD-10-CM

## 2022-05-24 DIAGNOSIS — D22 MELANOCYTIC NEVI: ICD-10-CM | Status: STABLE

## 2022-05-24 DIAGNOSIS — D18.0 HEMANGIOMA: ICD-10-CM | Status: STABLE

## 2022-05-24 DIAGNOSIS — L81.4 OTHER MELANIN HYPERPIGMENTATION: ICD-10-CM | Status: STABLE

## 2022-05-24 DIAGNOSIS — L82.1 OTHER SEBORRHEIC KERATOSIS: ICD-10-CM | Status: STABLE

## 2022-05-24 PROBLEM — D22.5 MELANOCYTIC NEVI OF TRUNK: Status: ACTIVE | Noted: 2022-05-24

## 2022-05-24 PROBLEM — D18.01 HEMANGIOMA OF SKIN AND SUBCUTANEOUS TISSUE: Status: ACTIVE | Noted: 2022-05-24

## 2022-05-24 PROCEDURE — ? FULL BODY SKIN EXAM

## 2022-05-24 PROCEDURE — ? ADDITIONAL NOTES

## 2022-05-24 PROCEDURE — ? COUNSELING

## 2022-05-24 PROCEDURE — ? LIQUID NITROGEN

## 2022-05-24 PROCEDURE — 17110 DESTRUCTION B9 LES UP TO 14: CPT

## 2022-05-24 PROCEDURE — 99213 OFFICE O/P EST LOW 20 MIN: CPT | Mod: 25

## 2022-05-24 PROCEDURE — ? SUNSCREEN RECOMMENDATIONS

## 2022-05-24 PROCEDURE — ? TREATMENT REGIMEN

## 2022-05-24 ASSESSMENT — LOCATION SIMPLE DESCRIPTION DERM
LOCATION SIMPLE: RIGHT SHOULDER
LOCATION SIMPLE: LEFT UPPER BACK
LOCATION SIMPLE: UPPER BACK
LOCATION SIMPLE: ABDOMEN
LOCATION SIMPLE: LEFT FOREARM

## 2022-05-24 ASSESSMENT — LOCATION DETAILED DESCRIPTION DERM
LOCATION DETAILED: PERIUMBILICAL SKIN
LOCATION DETAILED: SUPERIOR THORACIC SPINE
LOCATION DETAILED: RIGHT ANTERIOR SHOULDER
LOCATION DETAILED: LEFT INFERIOR UPPER BACK
LOCATION DETAILED: RIGHT POSTERIOR SHOULDER
LOCATION DETAILED: LEFT PROXIMAL DORSAL FOREARM

## 2022-05-24 ASSESSMENT — LOCATION ZONE DERM
LOCATION ZONE: ARM
LOCATION ZONE: TRUNK

## 2022-05-24 NOTE — PROCEDURE: LIQUID NITROGEN
Medical Necessity Information: It is in your best interest to select a reason for this procedure from the list below. All of these items fulfill various CMS LCD requirements except the new and changing color options.
Number Of Freeze-Thaw Cycles: 2 freeze-thaw cycles
Medical Necessity Clause: This procedure was medically necessary because the lesions that were treated were:
Post-Care Instructions: I reviewed with the patient in detail post-instructions. Patient is to wear sunprotection, and avoid picking at any of the treated lesions. Pt may apply Vaseline to crusted or scabbing areas.
Render Post-Care Instructions In Note?: yes
Include Z78.9 (Other Specified Conditions Influencing Health Status) As An Associated Diagnosis?: No
Spray Paint Text: The liquid nitrogen was applied to the skin utilizing a spray paint frosting technique.
Detail Level: Detailed
Consent: The patient's consent was obtained including but not limited to risks of crusting, scabbing, blistering, scarring, darker or lighter pigmentary change, recurrence, incomplete removal and infection.

## 2022-05-24 NOTE — PROCEDURE: MIPS QUALITY
Detail Level: Detailed
Quality 130: Documentation Of Current Medications In The Medical Record: Current Medications Documented
Quality 431: Preventive Care And Screening: Unhealthy Alcohol Use - Screening: Patient screened for unhealthy alcohol use using a single question and scores less than 2 times per year
Quality 226: Preventive Care And Screening: Tobacco Use: Screening And Cessation Intervention: Patient screened for tobacco use and is an ex/non-smoker
Quality 110: Preventive Care And Screening: Influenza Immunization: Influenza immunization was not ordered or administered, reason not given
Quality 474: Zoster Vaccination Status: Shingrix Vaccination not Administered or Previously Received, Reason not Otherwise Specified
Quality 111:Pneumonia Vaccination Status For Older Adults: Pneumococcal Vaccination not Administered or Previously Received, Reason not Otherwise Specified

## 2022-05-24 NOTE — HPI: EVALUATION OF SKIN LESION(S)
What Type Of Note Output Would You Prefer (Optional)?: Bullet Format
Hpi Title: Evaluation of Skin Lesions
How Severe Are Your Spot(S)?: mild
Have Your Spot(S) Been Treated In The Past?: has not been treated
Additional History: Patient here for skin check, she has one spot on her left arm

## 2023-02-21 ENCOUNTER — APPOINTMENT (RX ONLY)
Dept: URBAN - METROPOLITAN AREA CLINIC 170 | Facility: CLINIC | Age: 78
Setting detail: DERMATOLOGY
End: 2023-02-21

## 2023-02-21 DIAGNOSIS — D22 MELANOCYTIC NEVI: ICD-10-CM | Status: STABLE

## 2023-02-21 DIAGNOSIS — L81.4 OTHER MELANIN HYPERPIGMENTATION: ICD-10-CM | Status: STABLE

## 2023-02-21 DIAGNOSIS — Z85.828 PERSONAL HISTORY OF OTHER MALIGNANT NEOPLASM OF SKIN: ICD-10-CM | Status: STABLE

## 2023-02-21 DIAGNOSIS — D18.0 HEMANGIOMA: ICD-10-CM | Status: STABLE

## 2023-02-21 DIAGNOSIS — L82.0 INFLAMED SEBORRHEIC KERATOSIS: ICD-10-CM

## 2023-02-21 DIAGNOSIS — L82.1 OTHER SEBORRHEIC KERATOSIS: ICD-10-CM | Status: STABLE

## 2023-02-21 PROBLEM — D22.5 MELANOCYTIC NEVI OF TRUNK: Status: ACTIVE | Noted: 2023-02-21

## 2023-02-21 PROBLEM — D18.01 HEMANGIOMA OF SKIN AND SUBCUTANEOUS TISSUE: Status: ACTIVE | Noted: 2023-02-21

## 2023-02-21 PROCEDURE — ? TREATMENT REGIMEN

## 2023-02-21 PROCEDURE — ? SUNSCREEN RECOMMENDATIONS

## 2023-02-21 PROCEDURE — ? LIQUID NITROGEN

## 2023-02-21 PROCEDURE — ? ADDITIONAL NOTES

## 2023-02-21 PROCEDURE — ? COUNSELING

## 2023-02-21 PROCEDURE — ? FULL BODY SKIN EXAM

## 2023-02-21 PROCEDURE — 99213 OFFICE O/P EST LOW 20 MIN: CPT | Mod: 25

## 2023-02-21 PROCEDURE — 17110 DESTRUCTION B9 LES UP TO 14: CPT

## 2023-02-21 ASSESSMENT — LOCATION DETAILED DESCRIPTION DERM
LOCATION DETAILED: RIGHT ANTERIOR SHOULDER
LOCATION DETAILED: LEFT INFERIOR UPPER BACK
LOCATION DETAILED: LEFT RADIAL DORSAL HAND
LOCATION DETAILED: RIGHT RADIAL DORSAL HAND
LOCATION DETAILED: SUPERIOR THORACIC SPINE
LOCATION DETAILED: RIGHT DISTAL POSTERIOR THIGH
LOCATION DETAILED: PERIUMBILICAL SKIN
LOCATION DETAILED: RIGHT POSTERIOR SHOULDER

## 2023-02-21 ASSESSMENT — LOCATION ZONE DERM
LOCATION ZONE: TRUNK
LOCATION ZONE: ARM
LOCATION ZONE: LEG
LOCATION ZONE: HAND

## 2023-02-21 ASSESSMENT — LOCATION SIMPLE DESCRIPTION DERM
LOCATION SIMPLE: UPPER BACK
LOCATION SIMPLE: RIGHT SHOULDER
LOCATION SIMPLE: ABDOMEN
LOCATION SIMPLE: LEFT HAND
LOCATION SIMPLE: RIGHT POSTERIOR THIGH
LOCATION SIMPLE: LEFT UPPER BACK
LOCATION SIMPLE: RIGHT HAND

## 2023-02-21 NOTE — PROCEDURE: LIQUID NITROGEN
Consent: The patient's consent was obtained including but not limited to risks of crusting, scabbing, blistering, scarring, darker or lighter pigmentary change, recurrence, incomplete removal and infection.
Show Topical Anesthesia Variable?: Yes
Number Of Freeze-Thaw Cycles: 1 freeze-thaw cycle
Medical Necessity Information: It is in your best interest to select a reason for this procedure from the list below. All of these items fulfill various CMS LCD requirements except the new and changing color options.
Detail Level: Detailed
Add 52 Modifier (Optional): no
Medical Necessity Clause: This procedure was medically necessary because the lesions that were treated were:
Spray Paint Text: The liquid nitrogen was applied to the skin utilizing a spray paint frosting technique.
Post-Care Instructions: I reviewed with the patient in detail post-instructions. Patient is to wear sunprotection, and avoid picking at any of the treated lesions. Pt may apply Vaseline to crusted or scabbing areas.

## 2023-02-21 NOTE — PROCEDURE: MIPS QUALITY
Quality 226: Preventive Care And Screening: Tobacco Use: Screening And Cessation Intervention: Patient screened for tobacco use and is an ex/non-smoker
Detail Level: Detailed
Quality 474: Zoster Vaccination Status: Shingrix Vaccination not Administered or Previously Received, Reason not Otherwise Specified
Quality 130: Documentation Of Current Medications In The Medical Record: Current Medications Documented
Quality 111:Pneumonia Vaccination Status For Older Adults: Pneumococcal Vaccination not Administered or Previously Received, Reason not Otherwise Specified
Quality 110: Preventive Care And Screening: Influenza Immunization: Influenza immunization was not ordered or administered, reason not given
Quality 431: Preventive Care And Screening: Unhealthy Alcohol Use - Screening: Patient screened for unhealthy alcohol use using a single question and scores less than 2 times per year

## 2023-02-21 NOTE — HPI: EVALUATION OF SKIN LESION(S)
What Type Of Note Output Would You Prefer (Optional)?: Bullet Format
Hpi Title: Evaluation of Skin Lesions
How Severe Are Your Spot(S)?: mild
Have Your Spot(S) Been Treated In The Past?: has not been treated
Additional History: Pt here for skin cancer screening. Pt has spots on legs,

## 2023-12-04 ENCOUNTER — APPOINTMENT (RX ONLY)
Dept: URBAN - METROPOLITAN AREA CLINIC 170 | Facility: CLINIC | Age: 78
Setting detail: DERMATOLOGY
End: 2023-12-04

## 2023-12-04 DIAGNOSIS — L81.4 OTHER MELANIN HYPERPIGMENTATION: ICD-10-CM

## 2023-12-04 DIAGNOSIS — Z85.828 PERSONAL HISTORY OF OTHER MALIGNANT NEOPLASM OF SKIN: ICD-10-CM

## 2023-12-04 DIAGNOSIS — L82.0 INFLAMED SEBORRHEIC KERATOSIS: ICD-10-CM

## 2023-12-04 DIAGNOSIS — D22 MELANOCYTIC NEVI: ICD-10-CM

## 2023-12-04 DIAGNOSIS — D18.0 HEMANGIOMA: ICD-10-CM

## 2023-12-04 DIAGNOSIS — L82.1 OTHER SEBORRHEIC KERATOSIS: ICD-10-CM

## 2023-12-04 PROBLEM — D22.5 MELANOCYTIC NEVI OF TRUNK: Status: ACTIVE | Noted: 2023-12-04

## 2023-12-04 PROBLEM — D18.01 HEMANGIOMA OF SKIN AND SUBCUTANEOUS TISSUE: Status: ACTIVE | Noted: 2023-12-04

## 2023-12-04 PROCEDURE — ? MEDICARE ABN

## 2023-12-04 PROCEDURE — ? TREATMENT REGIMEN

## 2023-12-04 PROCEDURE — ? LIQUID NITROGEN

## 2023-12-04 PROCEDURE — ? SUNSCREEN RECOMMENDATIONS

## 2023-12-04 PROCEDURE — 99213 OFFICE O/P EST LOW 20 MIN: CPT | Mod: 25

## 2023-12-04 PROCEDURE — ? COUNSELING

## 2023-12-04 PROCEDURE — ? FULL BODY SKIN EXAM

## 2023-12-04 PROCEDURE — 17110 DESTRUCTION B9 LES UP TO 14: CPT

## 2023-12-04 ASSESSMENT — LOCATION DETAILED DESCRIPTION DERM
LOCATION DETAILED: PERIUMBILICAL SKIN
LOCATION DETAILED: RIGHT PROXIMAL POSTERIOR UPPER ARM
LOCATION DETAILED: LEFT INFERIOR UPPER BACK
LOCATION DETAILED: RIGHT ANTERIOR SHOULDER
LOCATION DETAILED: SUPERIOR THORACIC SPINE
LOCATION DETAILED: RIGHT POSTERIOR SHOULDER

## 2023-12-04 ASSESSMENT — LOCATION ZONE DERM
LOCATION ZONE: ARM
LOCATION ZONE: TRUNK

## 2023-12-04 ASSESSMENT — LOCATION SIMPLE DESCRIPTION DERM
LOCATION SIMPLE: LEFT UPPER BACK
LOCATION SIMPLE: ABDOMEN
LOCATION SIMPLE: RIGHT SHOULDER
LOCATION SIMPLE: RIGHT POSTERIOR UPPER ARM
LOCATION SIMPLE: UPPER BACK

## 2023-12-04 NOTE — PROCEDURE: LIQUID NITROGEN
Consent: The patient's consent was obtained including but not limited to risks of crusting, scabbing, blistering, scarring, darker or lighter pigmentary change, recurrence, incomplete removal and infection.
Medical Necessity Information: It is in your best interest to select a reason for this procedure from the list below. All of these items fulfill various CMS LCD requirements except the new and changing color options.
Spray Paint Text: The liquid nitrogen was applied to the skin utilizing a spray paint frosting technique.
Add 52 Modifier (Optional): no
Medical Necessity Clause: This procedure was medically necessary because the lesions that were treated were:
Render Post-Care Instructions In Note?: yes
Number Of Freeze-Thaw Cycles: 2 freeze-thaw cycles
Post-Care Instructions: I reviewed with the patient in detail post-instructions. Patient is to wear sunprotection, and avoid picking at any of the treated lesions. Pt may apply Vaseline to crusted or scabbing areas.
Detail Level: Detailed

## 2023-12-04 NOTE — PROCEDURE: MEDICARE ABN
Reason?: non-covered service
Detail Level: Detailed
Procedure (Limit To 20 Characters): brace
Reason?: Additional Information
Payment Option: Option 1: Bill Medicare, await for decision on payment.

## 2023-12-04 NOTE — HPI: EVALUATION OF SKIN LESION(S)
What Type Of Note Output Would You Prefer (Optional)?: Bullet Format
Hpi Title: Evaluation of Skin Lesions
Have Your Spot(S) Been Treated In The Past?: has been treated
Additional History: Check right upper arm, irritated lesion

## 2024-12-09 ENCOUNTER — APPOINTMENT (OUTPATIENT)
Dept: URBAN - METROPOLITAN AREA CLINIC 170 | Facility: CLINIC | Age: 79
Setting detail: DERMATOLOGY
End: 2024-12-09

## 2024-12-09 DIAGNOSIS — D18.0 HEMANGIOMA: ICD-10-CM | Status: STABLE

## 2024-12-09 DIAGNOSIS — L72.8 OTHER FOLLICULAR CYSTS OF THE SKIN AND SUBCUTANEOUS TISSUE: ICD-10-CM | Status: STABLE

## 2024-12-09 DIAGNOSIS — D22 MELANOCYTIC NEVI: ICD-10-CM | Status: STABLE

## 2024-12-09 DIAGNOSIS — L81.4 OTHER MELANIN HYPERPIGMENTATION: ICD-10-CM | Status: STABLE

## 2024-12-09 DIAGNOSIS — L82.1 OTHER SEBORRHEIC KERATOSIS: ICD-10-CM | Status: STABLE

## 2024-12-09 PROBLEM — D22.5 MELANOCYTIC NEVI OF TRUNK: Status: ACTIVE | Noted: 2024-12-09

## 2024-12-09 PROBLEM — D18.01 HEMANGIOMA OF SKIN AND SUBCUTANEOUS TISSUE: Status: ACTIVE | Noted: 2024-12-09

## 2024-12-09 PROCEDURE — ? COUNSELING

## 2024-12-09 PROCEDURE — 99213 OFFICE O/P EST LOW 20 MIN: CPT

## 2024-12-09 PROCEDURE — ? FULL BODY SKIN EXAM

## 2024-12-09 PROCEDURE — ? PRESCRIPTION MEDICATION MANAGEMENT

## 2024-12-09 PROCEDURE — ? TREATMENT REGIMEN

## 2024-12-09 ASSESSMENT — LOCATION DETAILED DESCRIPTION DERM
LOCATION DETAILED: EPIGASTRIC SKIN
LOCATION DETAILED: RIGHT INFERIOR MEDIAL MIDBACK
LOCATION DETAILED: MIDDLE STERNUM
LOCATION DETAILED: RIGHT SUPERIOR UPPER BACK
LOCATION DETAILED: INFERIOR THORACIC SPINE

## 2024-12-09 ASSESSMENT — LOCATION SIMPLE DESCRIPTION DERM
LOCATION SIMPLE: RIGHT LOWER BACK
LOCATION SIMPLE: RIGHT UPPER BACK
LOCATION SIMPLE: UPPER BACK
LOCATION SIMPLE: CHEST
LOCATION SIMPLE: ABDOMEN

## 2024-12-09 ASSESSMENT — LOCATION ZONE DERM: LOCATION ZONE: TRUNK

## 2024-12-09 NOTE — PROCEDURE: PRESCRIPTION MEDICATION MANAGEMENT
Render In Strict Bullet Format?: No
Plan: No need for oral antibiotics at this time
Detail Level: Simple

## 2025-01-19 NOTE — PROCEDURE: LIQUID NITROGEN
Medical Necessity Clause: This procedure was medically necessary because the lesions that were treated were:
Add 52 Modifier (Optional): no
Medical Necessity Information: It is in your best interest to select a reason for this procedure from the list below. All of these items fulfill various CMS LCD requirements except the new and changing color options.
Detail Level: Detailed
Consent: The patient's consent was obtained including but not limited to risks of crusting, scabbing, blistering, scarring, darker or lighter pigmentary change, recurrence, incomplete removal and infection.
Number Of Freeze-Thaw Cycles: 2 freeze-thaw cycles
Post-Care Instructions: I reviewed with the patient in detail post-care instructions. Patient is to wear sunprotection, and avoid picking at any of the treated lesions. Pt may apply Vaseline to crusted or scabbing areas.
Render Post-Care Instructions In Note?: yes
Negative